# Patient Record
Sex: MALE | Race: WHITE | Employment: FULL TIME | URBAN - METROPOLITAN AREA
[De-identification: names, ages, dates, MRNs, and addresses within clinical notes are randomized per-mention and may not be internally consistent; named-entity substitution may affect disease eponyms.]

---

## 2017-04-18 ENCOUNTER — HOSPITAL ENCOUNTER (EMERGENCY)
Age: 32
Discharge: HOME | End: 2017-04-18
Attending: EMERGENCY MEDICINE | Admitting: EMERGENCY MEDICINE
Payer: SELF-PAY

## 2017-04-18 VITALS
HEIGHT: 67 IN | SYSTOLIC BLOOD PRESSURE: 129 MMHG | DIASTOLIC BLOOD PRESSURE: 86 MMHG | OXYGEN SATURATION: 99 % | HEART RATE: 76 BPM | RESPIRATION RATE: 18 BRPM | BODY MASS INDEX: 27.34 KG/M2 | WEIGHT: 174.16 LBS | TEMPERATURE: 98.1 F

## 2017-04-18 DIAGNOSIS — K29.90 GASTRITIS AND DUODENITIS: Primary | ICD-10-CM

## 2017-04-18 LAB
ALBUMIN SERPL BCP-MCNC: 4.1 G/DL (ref 3.5–5)
ALBUMIN/GLOB SERPL: 1.2 {RATIO} (ref 1.1–2.2)
ALP SERPL-CCNC: 68 U/L (ref 45–117)
ALT SERPL-CCNC: 22 U/L (ref 12–78)
ANION GAP BLD CALC-SCNC: 11 MMOL/L (ref 5–15)
APPEARANCE UR: CLEAR
AST SERPL W P-5'-P-CCNC: 15 U/L (ref 15–37)
BASOPHILS # BLD AUTO: 0 K/UL (ref 0–0.1)
BASOPHILS # BLD: 0 % (ref 0–1)
BILIRUB SERPL-MCNC: 2.2 MG/DL (ref 0.2–1)
BILIRUB UR QL: NEGATIVE
BUN SERPL-MCNC: 8 MG/DL (ref 6–20)
BUN/CREAT SERPL: 8 (ref 12–20)
CALCIUM SERPL-MCNC: 9.1 MG/DL (ref 8.5–10.1)
CHLORIDE SERPL-SCNC: 107 MMOL/L (ref 97–108)
CO2 SERPL-SCNC: 24 MMOL/L (ref 21–32)
COLOR UR: ABNORMAL
CREAT SERPL-MCNC: 1.01 MG/DL (ref 0.7–1.3)
EOSINOPHIL # BLD: 0.1 K/UL (ref 0–0.4)
EOSINOPHIL NFR BLD: 1 % (ref 0–7)
ERYTHROCYTE [DISTWIDTH] IN BLOOD BY AUTOMATED COUNT: 13 % (ref 11.5–14.5)
GLOBULIN SER CALC-MCNC: 3.4 G/DL (ref 2–4)
GLUCOSE SERPL-MCNC: 90 MG/DL (ref 65–100)
GLUCOSE UR STRIP.AUTO-MCNC: NEGATIVE MG/DL
HCT VFR BLD AUTO: 42.8 % (ref 36.6–50.3)
HGB BLD-MCNC: 15 G/DL (ref 12.1–17)
HGB UR QL STRIP: NEGATIVE
KETONES UR QL STRIP.AUTO: 15 MG/DL
LEUKOCYTE ESTERASE UR QL STRIP.AUTO: NEGATIVE
LIPASE SERPL-CCNC: 137 U/L (ref 73–393)
LYMPHOCYTES # BLD AUTO: 27 % (ref 12–49)
LYMPHOCYTES # BLD: 2.6 K/UL (ref 0.8–3.5)
MCH RBC QN AUTO: 30.8 PG (ref 26–34)
MCHC RBC AUTO-ENTMCNC: 35 G/DL (ref 30–36.5)
MCV RBC AUTO: 87.9 FL (ref 80–99)
MONOCYTES # BLD: 0.7 K/UL (ref 0–1)
MONOCYTES NFR BLD AUTO: 7 % (ref 5–13)
NEUTS SEG # BLD: 6.2 K/UL (ref 1.8–8)
NEUTS SEG NFR BLD AUTO: 65 % (ref 32–75)
NITRITE UR QL STRIP.AUTO: NEGATIVE
PH UR STRIP: 7 [PH] (ref 5–8)
PLATELET # BLD AUTO: 282 K/UL (ref 150–400)
POTASSIUM SERPL-SCNC: 3.6 MMOL/L (ref 3.5–5.1)
PROT SERPL-MCNC: 7.5 G/DL (ref 6.4–8.2)
PROT UR STRIP-MCNC: NEGATIVE MG/DL
RBC # BLD AUTO: 4.87 M/UL (ref 4.1–5.7)
SODIUM SERPL-SCNC: 142 MMOL/L (ref 136–145)
SP GR UR REFRACTOMETRY: 1.02 (ref 1–1.03)
UROBILINOGEN UR QL STRIP.AUTO: 1 EU/DL (ref 0.2–1)
WBC # BLD AUTO: 9.6 K/UL (ref 4.1–11.1)

## 2017-04-18 PROCEDURE — 74011250637 HC RX REV CODE- 250/637: Performed by: EMERGENCY MEDICINE

## 2017-04-18 PROCEDURE — 74011250636 HC RX REV CODE- 250/636: Performed by: EMERGENCY MEDICINE

## 2017-04-18 PROCEDURE — 81003 URINALYSIS AUTO W/O SCOPE: CPT

## 2017-04-18 PROCEDURE — 85025 COMPLETE CBC W/AUTO DIFF WBC: CPT

## 2017-04-18 PROCEDURE — 80053 COMPREHEN METABOLIC PANEL: CPT

## 2017-04-18 PROCEDURE — 99283 EMERGENCY DEPT VISIT LOW MDM: CPT

## 2017-04-18 PROCEDURE — 96375 TX/PRO/DX INJ NEW DRUG ADDON: CPT

## 2017-04-18 PROCEDURE — C9113 INJ PANTOPRAZOLE SODIUM, VIA: HCPCS | Performed by: EMERGENCY MEDICINE

## 2017-04-18 PROCEDURE — 36415 COLL VENOUS BLD VENIPUNCTURE: CPT

## 2017-04-18 PROCEDURE — 83690 ASSAY OF LIPASE: CPT

## 2017-04-18 PROCEDURE — 96374 THER/PROPH/DIAG INJ IV PUSH: CPT

## 2017-04-18 PROCEDURE — 74011000250 HC RX REV CODE- 250: Performed by: EMERGENCY MEDICINE

## 2017-04-18 RX ORDER — HYDROGEN PEROXIDE 3 %
40 SOLUTION, NON-ORAL MISCELLANEOUS DAILY
COMMUNITY
End: 2020-04-02 | Stop reason: ALTCHOICE

## 2017-04-18 RX ORDER — ONDANSETRON 2 MG/ML
4 INJECTION INTRAMUSCULAR; INTRAVENOUS
Status: COMPLETED | OUTPATIENT
Start: 2017-04-18 | End: 2017-04-18

## 2017-04-18 RX ORDER — SUCRALFATE 1 G/1
1 TABLET ORAL 4 TIMES DAILY
Qty: 15 TAB | Refills: 0 | Status: SHIPPED | OUTPATIENT
Start: 2017-04-18 | End: 2020-04-02 | Stop reason: ALTCHOICE

## 2017-04-18 RX ADMIN — LIDOCAINE HYDROCHLORIDE 40 ML: 20 SOLUTION ORAL; TOPICAL at 07:57

## 2017-04-18 RX ADMIN — SODIUM CHLORIDE 40 MG: 9 INJECTION INTRAMUSCULAR; INTRAVENOUS; SUBCUTANEOUS at 07:57

## 2017-04-18 RX ADMIN — ONDANSETRON HYDROCHLORIDE 4 MG: 2 INJECTION, SOLUTION INTRAMUSCULAR; INTRAVENOUS at 07:58

## 2017-04-18 RX ADMIN — SODIUM CHLORIDE 500 ML: 900 INJECTION, SOLUTION INTRAVENOUS at 08:52

## 2017-04-18 NOTE — ED PROVIDER NOTES
HPI Comments: 26 yo M with PMHx of severe GERD, hx of hiatal hernia, ureteral stone presenting with c/o n/v, diarrhea. Pt states for the past 2 weeks he has been taking double his dose of nexium. This morning approximately 3 am he woke with burning sensation in chest and vomited x 5 with 5 diarrhea episodes. Denies bloody in emesis and stool. Denies sick contacts or suspicious foods. Denies f/c, abdominal pain, chest pain. States he was having regular BM prior to onset of diarrhea. No hx of abdominal surgeries. No urinary tract sx's. Denies hx of cholelithiasis. Denies alcohol use. Takes 1 aleve every other day. He is not being followed by a GI physician. Patient is a 27 y.o. male presenting with abdominal pain. Abdominal Pain    Associated symptoms include diarrhea, nausea and vomiting. Pertinent negatives include no fever and no chest pain. No past medical history on file. No past surgical history on file. No family history on file. Social History     Social History    Marital status: SINGLE     Spouse name: N/A    Number of children: N/A    Years of education: N/A     Occupational History    Not on file. Social History Main Topics    Smoking status: Never Smoker    Smokeless tobacco: Not on file    Alcohol use No    Drug use: Not on file    Sexual activity: Not on file     Other Topics Concern    Not on file     Social History Narrative         ALLERGIES: Review of patient's allergies indicates no known allergies. Review of Systems   Constitutional: Negative. Negative for chills and fever. HENT: Negative. Respiratory: Negative. Negative for cough. Cardiovascular: Negative. Negative for chest pain. Gastrointestinal: Positive for abdominal pain (epigastric), diarrhea, nausea and vomiting. Negative for abdominal distention and blood in stool. Reflux; -hematemesis   Genitourinary: Negative. Musculoskeletal: Negative. Skin: Negative.     Neurological: Negative. Psychiatric/Behavioral: Negative. Patient Vitals for the past 12 hrs:   Temp Pulse Resp BP SpO2   04/18/17 0901 - - - - 99 %   04/18/17 0731 98.1 °F (36.7 °C) 76 18 129/86 99 %   04/18/17 0728 - - - 129/86 -            Physical Exam   Constitutional: He is oriented to person, place, and time. He appears well-developed and well-nourished. No distress. HENT:   Head: Normocephalic and atraumatic. Eyes: Conjunctivae and EOM are normal.   Neck: Neck supple. Cardiovascular: Normal rate and regular rhythm. Pulmonary/Chest: Effort normal and breath sounds normal. No respiratory distress. He has no wheezes. He has no rales. Abdominal: Soft. Bowel sounds are normal. He exhibits no distension. There is no tenderness. There is no rebound and no guarding. No CVA ttp   Musculoskeletal: Normal range of motion. Neurological: He is alert and oriented to person, place, and time. Skin: Skin is warm and dry. He is not diaphoretic. Psychiatric: He has a normal mood and affect. Nursing note and vitals reviewed. MDM  Number of Diagnoses or Management Options  Diagnosis management comments: 28 yo M with PMHx of severe GERD, hx of hiatal hernia, ureteral stone presenting with n/v, diarrhea and concern for GERD flare up. DDx: Gastritis, GERD, PUD, pancreatitis, gastroenteritis, bowel obstruction. Suspect patient is having acute GERD flare. Low suspicion for acute PUD. Pt has a benign abdomen with no evidence of gastric perforation. Pt having normal bowel movements prior to onset of sx's without abdominal distention and normal bowel sounds therefore do not suspect bowel obstruction. No signs or symptoms of UTI or renal colic. Pt does not appear dehydrated. Will check CBC for leukocytosis, BMP for electrolyte abnormality, LFTS and lipase for hepatobiliary and pancreatic pathology. UA to r/o UTI. Will treat with GI cocktail, IV PPI, and zofran.      ED Course       Procedures   4394 - Pt feels better and has tolerated PO. Labs unremarkable. Will dc home with sucralfate and GI follow up. LABORATORY TESTS:  Recent Results (from the past 12 hour(s))   CBC WITH AUTOMATED DIFF    Collection Time: 04/18/17  7:53 AM   Result Value Ref Range    WBC 9.6 4.1 - 11.1 K/uL    RBC 4.87 4.10 - 5.70 M/uL    HGB 15.0 12.1 - 17.0 g/dL    HCT 42.8 36.6 - 50.3 %    MCV 87.9 80.0 - 99.0 FL    MCH 30.8 26.0 - 34.0 PG    MCHC 35.0 30.0 - 36.5 g/dL    RDW 13.0 11.5 - 14.5 %    PLATELET 725 992 - 359 K/uL    NEUTROPHILS 65 32 - 75 %    LYMPHOCYTES 27 12 - 49 %    MONOCYTES 7 5 - 13 %    EOSINOPHILS 1 0 - 7 %    BASOPHILS 0 0 - 1 %    ABS. NEUTROPHILS 6.2 1.8 - 8.0 K/UL    ABS. LYMPHOCYTES 2.6 0.8 - 3.5 K/UL    ABS. MONOCYTES 0.7 0.0 - 1.0 K/UL    ABS. EOSINOPHILS 0.1 0.0 - 0.4 K/UL    ABS. BASOPHILS 0.0 0.0 - 0.1 K/UL   METABOLIC PANEL, COMPREHENSIVE    Collection Time: 04/18/17  7:53 AM   Result Value Ref Range    Sodium 142 136 - 145 mmol/L    Potassium 3.6 3.5 - 5.1 mmol/L    Chloride 107 97 - 108 mmol/L    CO2 24 21 - 32 mmol/L    Anion gap 11 5 - 15 mmol/L    Glucose 90 65 - 100 mg/dL    BUN 8 6 - 20 MG/DL    Creatinine 1.01 0.70 - 1.30 MG/DL    BUN/Creatinine ratio 8 (L) 12 - 20      GFR est AA >60 >60 ml/min/1.73m2    GFR est non-AA >60 >60 ml/min/1.73m2    Calcium 9.1 8.5 - 10.1 MG/DL    Bilirubin, total 2.2 (H) 0.2 - 1.0 MG/DL    ALT (SGPT) 22 12 - 78 U/L    AST (SGOT) 15 15 - 37 U/L    Alk.  phosphatase 68 45 - 117 U/L    Protein, total 7.5 6.4 - 8.2 g/dL    Albumin 4.1 3.5 - 5.0 g/dL    Globulin 3.4 2.0 - 4.0 g/dL    A-G Ratio 1.2 1.1 - 2.2     LIPASE    Collection Time: 04/18/17  7:53 AM   Result Value Ref Range    Lipase 137 73 - 393 U/L   URINALYSIS W/ RFLX MICROSCOPIC    Collection Time: 04/18/17  8:51 AM   Result Value Ref Range    Color YELLOW/STRAW      Appearance CLEAR CLEAR      Specific gravity 1.020 1.003 - 1.030      pH (UA) 7.0 5.0 - 8.0      Protein NEGATIVE  NEG mg/dL    Glucose NEGATIVE  NEG mg/dL Ketone 15 (A) NEG mg/dL    Bilirubin NEGATIVE  NEG      Blood NEGATIVE  NEG      Urobilinogen 1.0 0.2 - 1.0 EU/dL    Nitrites NEGATIVE  NEG      Leukocyte Esterase NEGATIVE  NEG         IMAGING RESULTS:  No orders to display       MEDICATIONS GIVEN:  Medications   mylanta/viscous lidocaine (JI)(GI COCKTAIL) (40 mL Oral Given 4/18/17 9567)   ondansetron (ZOFRAN) injection 4 mg (4 mg IntraVENous Given 4/18/17 6728)   pantoprazole (PROTONIX) 40 mg in sodium chloride 0.9 % 10 mL injection (40 mg IntraVENous Given 4/18/17 6687)   sodium chloride 0.9 % bolus infusion 500 mL (0 mL IntraVENous IV Completed 4/18/17 5422)       IMPRESSION:  1. Gastritis and duodenitis        PLAN:  Current Discharge Medication List      START taking these medications    Details   sucralfate (CARAFATE) 1 gram tablet Take 1 Tab by mouth four (4) times daily. Qty: 15 Tab, Refills: 0           Follow-up Information     Follow up With Details Comments Contact Info    None   None ([phone]) Patient stated that they have no PCP      [facility] EMERGENCY DEPT  If symptoms worsen [address] [place] [address] Gastroenterology [address] [facility] an appointment as soon as possible for a visit in 2 weeks  [address]  [address] [address] [facility]  [phone]        Return to ED if worse       DISCHARGE NOTE:    9:52 AM  The patient is ready for discharge. The patient signs, symptoms, diagnosis, and discharge instructions have been discussed and the patient has conveyed their understanding. The patient is to follow-up as reccommended or returned to the ER should their symptoms worsen. Plan has been discussed and patient is in agreement.             ------------------------------------------  Begin Attending Documentation  ------------------------------------------    Attending Attestation: I was not present during the patient's evaluation by the resident.   I personally evaluated the patient including the history and physical. I have read the resident's note and agree with their history, physical and plan. HPI: Zion Xavier is a 27 y.o. male with hx of GERD who presents ambulatory to ED [facility] ED with CC of N/V/D and burning epigastric ABD pain since ~0300 today. Pt reports ~5-6 episodes of vomiting and diarrhea since onset. Pt reports he has been taking Nexium x 2 daily x ~1-2 weeks, noting his usual GERD symptoms have been notably worse. Pt denies factors which exacerbate his symptoms, and specifically denies his symptoms are exacerbated by PO intake. He denies sick contacts, recent travel, or eating any unusual or questionable foods. Pt denies hx of ABD surgeries. He denies currently being followed by a GI specialist. Pt denies current abdominal pain, hematochezia, hematemesis, fever, chills, cough, and CP. PCP: None    There are no other complaints, changes, or physical findings at this time. Written by David Carvajal ED Scribe, as dictated by Carola Renae MD.    PE:  Gen: NAD, WD/WN   Heart: nl S1, S2, no m/r/g   Lungs: CTAB, no w/r/r   Abd: soft, nttp, ND   Ext: no swelling   Skin: no rashes   Neuro: grossly intact, no focal deficits    Assessment:   Differential includes gastritis, gastroenteritis, GERD, PUD, cholecystitis, choledocholithiasis, pancreatitis, UTI, nephrolithiasis. Patient presents to ED with v/d. He reports recent epigastric pain but denies current abdominal pain. CBC, CMP, lipase and UA WNL. Abdominal exam is benign - do not suspect acute intraabdominal process. Symptoms likely secondary to gastroenteritis vs gastritis vs GERD vs PUD. Patient is feeling better after symptomatic management and is tolerating po. Will discharge with carafate and GI follow up.     Diagnosis: Gastroenteritis    Genny Lynne MD.    ------------------------------------------  End Attending Documentation  ------------------------------------------

## 2017-04-18 NOTE — Clinical Note
Please return to ER if you develop severe abdominal or chest pain, fever or chills, or inability to to take in food or water.

## 2019-05-15 PROBLEM — K21.9 GERD WITHOUT ESOPHAGITIS: Status: ACTIVE | Noted: 2019-05-15

## 2020-04-02 ENCOUNTER — OFFICE VISIT (OUTPATIENT)
Dept: INTERNAL MEDICINE CLINIC | Age: 35
End: 2020-04-02

## 2020-04-02 VITALS
RESPIRATION RATE: 14 BRPM | OXYGEN SATURATION: 97 % | WEIGHT: 194.2 LBS | HEIGHT: 67 IN | TEMPERATURE: 97.7 F | HEART RATE: 90 BPM | BODY MASS INDEX: 30.48 KG/M2 | SYSTOLIC BLOOD PRESSURE: 118 MMHG | DIASTOLIC BLOOD PRESSURE: 70 MMHG

## 2020-04-02 DIAGNOSIS — Z00.00 ROUTINE PHYSICAL EXAMINATION: Primary | ICD-10-CM

## 2020-04-02 DIAGNOSIS — K21.9 GERD WITHOUT ESOPHAGITIS: ICD-10-CM

## 2020-04-02 DIAGNOSIS — J01.00 ACUTE MAXILLARY SINUSITIS, RECURRENCE NOT SPECIFIED: ICD-10-CM

## 2020-04-02 LAB
A-G RATIO,AGRAT: 1.5 RATIO
ALBUMIN SERPL-MCNC: 4.5 G/DL (ref 3.9–5.4)
ALP SERPL-CCNC: 76 U/L (ref 38–126)
ALT SERPL-CCNC: 33 U/L (ref 0–50)
ANION GAP SERPL CALC-SCNC: 15 MMOL/L
AST SERPL W P-5'-P-CCNC: 26 U/L (ref 14–36)
BACTERIA,BACTU: ABNORMAL
BILIRUB SERPL-MCNC: 0.9 MG/DL (ref 0.2–1.3)
BILIRUB UR QL: NEGATIVE
BUN SERPL-MCNC: 14 MG/DL (ref 9–20)
BUN/CREATININE RATIO,BUCR: 13 RATIO
CALCIUM SERPL-MCNC: 9.9 MG/DL (ref 8.4–10.2)
CHLORIDE SERPL-SCNC: 103 MMOL/L (ref 98–107)
CHOL/HDL RATIO,CHHD: 4 RATIO (ref 0–4)
CHOLEST SERPL-MCNC: 167 MG/DL (ref 0–200)
CLARITY: CLEAR
CO2 SERPL-SCNC: 25 MMOL/L (ref 22–32)
COLOR UR: ABNORMAL
CREAT SERPL-MCNC: 1.1 MG/DL (ref 0.8–1.5)
GLOBULIN,GLOB: 3.1
GLUCOSE 24H UR-MRATE: NEGATIVE G/(24.H)
GLUCOSE SERPL-MCNC: 99 MG/DL (ref 75–110)
HDLC SERPL-MCNC: 41 MG/DL (ref 35–130)
HGB UR QL STRIP: NEGATIVE
KETONES UR QL STRIP.AUTO: NEGATIVE
LDL/HDL RATIO,LDHD: 2 RATIO
LDLC SERPL CALC-MCNC: 84 MG/DL (ref 0–130)
LEUKOCYTE ESTERASE: NEGATIVE
NITRITE UR QL STRIP.AUTO: NEGATIVE
PH UR STRIP: 7 [PH] (ref 5–7)
POTASSIUM SERPL-SCNC: 4.3 MMOL/L (ref 3.6–5)
PROT SERPL-MCNC: 7.6 G/DL (ref 6.3–8.2)
PROT UR STRIP-MCNC: NEGATIVE MG/DL
RBC #/AREA URNS HPF: 0 #/HPF
SODIUM SERPL-SCNC: 143 MMOL/L (ref 137–145)
SP GR UR REFRACTOMETRY: 1.01 (ref 1–1.03)
TRIGL SERPL-MCNC: 209 MG/DL (ref 0–200)
UROBILINOGEN UR QL STRIP.AUTO: NEGATIVE
VLDLC SERPL CALC-MCNC: 42 MG/DL
WBC URNS QL MICRO: 0 #/HPF

## 2020-04-02 RX ORDER — CEFUROXIME AXETIL 500 MG/1
500 TABLET ORAL 2 TIMES DAILY
Qty: 20 TAB | Refills: 0 | Status: SHIPPED | OUTPATIENT
Start: 2020-04-02 | End: 2020-04-12

## 2020-04-02 NOTE — PROGRESS NOTES
Jef Tses. is a 27 y.o. male presenting for Complete Physical and Sinus Infection  . 1. Have you been to the ER, urgent care clinic since your last visit? Hospitalized since your last visit? No    2. Have you seen or consulted any other health care providers outside of the [address] [place] [address] since your last visit? Include any pap smears or colon screening. No    No flowsheet data found. No flowsheet data found. 3 most recent PHQ Screens 4/2/2020   Little interest or pleasure in doing things Not at all   Feeling down, depressed, irritable, or hopeless Not at all   Total Score PHQ 2 0       There are no discontinued medications.

## 2020-04-02 NOTE — PROGRESS NOTES
This note will not be viewable in [address] Subjective:     Delisa Sher is a 27 y.o. male presenting for annual comprehensive personal healthcare examination. Jaxson Esparza is a 54-year-old single male who presents to the office today with complete checkup. He also complains of a sinus infection and bilateral foot pain. The patient generally has been in excellent health. He has no active medical problems but is suffered with acid reflux in the past.  He denies any dysphagia or unexplained weight loss. He has had no breakthrough heartburn symptoms. The patient has never had surgery, is on no medications, does not smoke or drink. 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 He complains of an upper respiratory infection which is been ongoing for a week or 2. He has sinus congestion with yellow to green postnasal drainage. He denies any facial pain or severe headaches. He denies any severe sore throat and has had no cough or fever. He denies any fatigue. He also complains of bilateral foot pain. Pain is localized on the bottom of the feet and usually gets worse if he sits for any length of time and then gets up to walk or when he first gets up in the morning. The patient has changed shoes/boots 3 times in the several month period of time that he has had this pain. He does not usually go barefoot. He notes no foot swelling or redness. History of present illness: This patient has multiple medical problems. These include:  Patient Active Problem List   Diagnosis Code    GERD without esophagitis K21.9        History reviewed. No pertinent past medical history. History reviewed. No pertinent surgical history. No Known Allergies  Current Outpatient Medications   Medication Sig Dispense Refill    cefUROXime (CEFTIN) 500 mg tablet Take 1 Tab by mouth two (2) times a day for 10 days.  20 Tab 0     Social History     Socioeconomic History    Marital status: SINGLE     Spouse name: Not on file  Number of children: Not on file    Years of education: Not on file    Highest education level: Not on file   Tobacco Use    Smoking status: Never Smoker    Smokeless tobacco: Never Used   Substance and Sexual Activity    Alcohol use: No    Drug use: No     History reviewed. No pertinent family history. Health Maintenance   Topic Date Due    Influenza Age 5 to Adult  08/01/2020    DTaP/Tdap/Td series (2 - Td) 08/11/2026    Pneumococcal 0-64 years  Aged Out       Review of Systems  Constitutional:  He denies fevers, weight loss, sweats, or fatigue. HEENT: Positive for sinus congestion, purulent postnasal drainage and maxillary sinus discomfort  Respiratory:  No cough, wheezing or shortness of breath, or sputum production. Cardiac:  Denies chest pain, palpitations, unexplained indigestion, syncope, edema, PND or orthopnea. GI:  No changes in bowel habits, abdominal pain, no bloating, anorexia, nausea, vomiting or heartburn. :  He denies frequency, nocturia, stranguria, dysuria or sexual dysfunction. Extremities: Today for bilateral foot pain which is generally worse when he first gets out of bed in the morning and improves the more he is walking during the day  Skin:  No recent rash or mole changes. Neurological:  No numbness, tingling, burning paresthesias or loss of motor strength. No syncope, dizziness, frequent headaches or memory loss. ROS otherwise negative       Objective:     Vitals:    04/02/20 1109   BP: 118/70   Pulse: 90   Resp: 14   Temp: 97.7 °F (36.5 °C)   TempSrc: Oral   SpO2: 97%   Weight: 194 lb 3.2 oz (88.1 kg)   Height: 5' 7\" (1.702 m)   PainSc:   0 - No pain      Body mass index is 30.42 kg/m². Physical exam:   General Appearance:  Well-developed, well-nourished, no acute distress. Vision:  Deferred to ophthalmologist.    Hearing: HEENT:    Ears:  The TMs and ear canals were clear.   Eyes:  The pupillary responses were normal.  Extraocular muscle function intact. Lids and conjunctiva not injected. Funduscopic exam revealed sharp disc margins. Pharynx:  Clear with teeth in good repair. A yellowish postnasal drainage is noted neck:  Supple without thyromegaly or adenopathy. No JVD noted. No carotid bruits. Lungs: Clear to auscultation and percussion. Cardiac:  Regular rate and rhythm. No murmur. PMI not displaced. No gallop, rub or click. Abdomen:  Flat, soft, non-tender without palpable organomegaly or mass. No pulsatile mass was felt, and no bruit was heard. Bowel sounds were active. Extremities:  No clubbing, cyanosis or edema. Bilateral plantar fascial pain present with palpation  Pulses:  Dorsalis pedis and posterior tibial pulses felt without difficulty. Skin:  No unusual rash or mole changes noted. Lymph Nodes:  None felt in the cervical, supraclavicular, axillary or inguinal region. Neurological:  Cranial nerves II-XII grossly intact. Motor strength 5/5. DTRs 2+ and symmetric. Station and gait normal.         Assessment/Plan:   Impressions:  Diagnoses and all orders for this visit:    Routine physical examination  -     CBC WITH AUTOMATED DIFF  -     COLLECTION VENOUS BLOOD,VENIPUNCTURE  -     LIPID PANEL  -     METABOLIC PANEL, COMPREHENSIVE  -     TSH 3RD GENERATION  -     URINALYSIS W/MICROSCOPIC    GERD without esophagitis    Acute maxillary sinusitis, recurrence not specified  -     cefUROXime (CEFTIN) 500 mg tablet; Take 1 Tab by mouth two (2) times a day for 10 days. , Normal, Disp-20 Tab, R-0        Other instructions: The patient is currently on no medications. Body mass index is 30.4 and dietary counseling along with printed patient education is given    I have prescribed Ceftin for his sinusitis and have recommended Mucinex and Sudafed as needed for his symptoms. Heel stretches, Advil/Aleve, good fitting shoes recommended for plantar fasciitis.   Should he fail conservative management over the next 2 to 4 weeks would refer to foot and ankle specialist.    Await results of multiple labs    Follow-up yearly    Follow-up and Dispositions    · Return in about 1 year (around 4/2/2021). Willie Martinez MD    Please note that this dictation was completed with [employer], the computer voice recognition software. Quite often unanticipated grammatical, syntax, homophones, and other interpretive errors are inadvertently transcribed by the computer software. Please disregard these errors. Please excuse any errors that have escaped final proofreading.

## 2020-04-02 NOTE — PATIENT INSTRUCTIONS

## 2020-04-03 LAB
BASOPHILS # BLD AUTO: 0.1 X10E3/UL (ref 0–0.2)
BASOPHILS NFR BLD AUTO: 1 %
EOSINOPHIL # BLD AUTO: 0.2 X10E3/UL (ref 0–0.4)
EOSINOPHIL NFR BLD AUTO: 2 %
ERYTHROCYTE [DISTWIDTH] IN BLOOD BY AUTOMATED COUNT: 13.1 % (ref 11.6–15.4)
HCT VFR BLD AUTO: 45.5 % (ref 37.5–51)
HGB BLD-MCNC: 15.3 G/DL (ref 13–17.7)
IMM GRANULOCYTES # BLD AUTO: 0.1 X10E3/UL (ref 0–0.1)
IMM GRANULOCYTES NFR BLD AUTO: 1 %
LYMPHOCYTES # BLD AUTO: 3.2 X10E3/UL (ref 0.7–3.1)
LYMPHOCYTES NFR BLD AUTO: 33 %
MCH RBC QN AUTO: 30.2 PG (ref 26.6–33)
MCHC RBC AUTO-ENTMCNC: 33.6 G/DL (ref 31.5–35.7)
MCV RBC AUTO: 90 FL (ref 79–97)
MONOCYTES # BLD AUTO: 0.6 X10E3/UL (ref 0.1–0.9)
MONOCYTES NFR BLD AUTO: 6 %
NEUTROPHILS # BLD AUTO: 5.7 X10E3/UL (ref 1.4–7)
NEUTROPHILS NFR BLD AUTO: 57 %
PLATELET # BLD AUTO: 375 X10E3/UL (ref 150–450)
RBC # BLD AUTO: 5.06 X10E6/UL (ref 4.14–5.8)
TSH SERPL DL<=0.05 MIU/L-ACNC: 1.73 UIU/ML (ref 0.34–5.6)
WBC # BLD AUTO: 9.7 X10E3/UL (ref 3.4–10.8)

## 2020-09-23 ENCOUNTER — TELEPHONE (OUTPATIENT)
Dept: INTERNAL MEDICINE CLINIC | Age: 35
End: 2020-09-23

## 2020-09-23 NOTE — TELEPHONE ENCOUNTER
Patient accepted appt with Zuleyka Valencia on 9/29/20 at 8:00 am. He is on a big job and is unable to come in tomorrow. He would like an appt sooner if it's possible.

## 2020-09-23 NOTE — TELEPHONE ENCOUNTER
Patient states he has a sinus infection. He has a bloody nose and drainage but no fever.  He would like a prescription called in.     [phone]

## 2020-09-29 ENCOUNTER — OFFICE VISIT (OUTPATIENT)
Dept: INTERNAL MEDICINE CLINIC | Age: 35
End: 2020-09-29
Payer: COMMERCIAL

## 2020-09-29 VITALS
OXYGEN SATURATION: 96 % | SYSTOLIC BLOOD PRESSURE: 129 MMHG | WEIGHT: 197.8 LBS | RESPIRATION RATE: 18 BRPM | HEART RATE: 81 BPM | HEIGHT: 67 IN | BODY MASS INDEX: 31.04 KG/M2 | TEMPERATURE: 98.6 F | DIASTOLIC BLOOD PRESSURE: 92 MMHG

## 2020-09-29 DIAGNOSIS — J01.10 ACUTE FRONTAL SINUSITIS, RECURRENCE NOT SPECIFIED: Primary | ICD-10-CM

## 2020-09-29 DIAGNOSIS — G43.109 MIGRAINE WITH AURA AND WITHOUT STATUS MIGRAINOSUS, NOT INTRACTABLE: ICD-10-CM

## 2020-09-29 DIAGNOSIS — K21.9 GERD WITHOUT ESOPHAGITIS: ICD-10-CM

## 2020-09-29 DIAGNOSIS — K44.9 HIATAL HERNIA: ICD-10-CM

## 2020-09-29 PROCEDURE — 99214 OFFICE O/P EST MOD 30 MIN: CPT | Performed by: NURSE PRACTITIONER

## 2020-09-29 PROCEDURE — 96372 THER/PROPH/DIAG INJ SC/IM: CPT | Performed by: NURSE PRACTITIONER

## 2020-09-29 RX ORDER — BUTALBITAL, ACETAMINOPHEN AND CAFFEINE 50; 325; 40 MG/1; MG/1; MG/1
1 TABLET ORAL
Qty: 60 TAB | Refills: 1 | Status: SHIPPED | OUTPATIENT
Start: 2020-09-29 | End: 2021-10-12 | Stop reason: ALTCHOICE

## 2020-09-29 RX ORDER — METHYLPREDNISOLONE ACETATE 40 MG/ML
40 INJECTION, SUSPENSION INTRA-ARTICULAR; INTRALESIONAL; INTRAMUSCULAR; SOFT TISSUE ONCE
Qty: 1 VIAL | Refills: 0
Start: 2020-09-29 | End: 2020-09-29

## 2020-09-29 RX ORDER — PANTOPRAZOLE SODIUM 40 MG/1
40 TABLET, DELAYED RELEASE ORAL DAILY
Qty: 90 TAB | Refills: 1 | Status: SHIPPED | OUTPATIENT
Start: 2020-09-29 | End: 2021-03-29

## 2020-09-29 RX ORDER — AMOXICILLIN AND CLAVULANATE POTASSIUM 875; 125 MG/1; MG/1
1 TABLET, FILM COATED ORAL EVERY 12 HOURS
Qty: 14 TAB | Refills: 0 | Status: SHIPPED | OUTPATIENT
Start: 2020-09-29 | End: 2021-10-12 | Stop reason: ALTCHOICE

## 2020-09-29 NOTE — PROGRESS NOTES
Subjective:     No chief complaint on file. History of present illness:  Angeline Patel is a 27 y.o. male who presents today with worsening frontal facial pressure, with thick yellow/green discharge from his frontal sinuses, and occasional cough secondary to postnasal drip. He states he has not been using anything over-the-counter to treat his symptoms. He states his nasal discharge is worse in the morning, and sometimes streaked with blood. He denies any fever, nausea, or vomiting. Additionally the patient presents with a history of gastroesophageal reflux disease, and hiatal hernia. He states these were diagnosed after hospitalization a few years ago. He had an esophagogastroduodenoscopy done at that time for verification and diagnosis. At that time, he was placed on Nexium 40 mg daily, however he discontinued use of this after he had heard some legal issues and lawsuits related to use. He states that he has had worsening reflux symptoms over the past several months, and has tried some over-the-counter Nexium to combat it. He states he would like to try something different if possible. He states they gave him Protonix when he was hospitalized, and it seemed to work well for him. The patient also has a history of migraine headaches as a juvenile, and had used something in the past to help with his episodes. He states there was a hiatus of his migraines for a period of time, but has not noticed them returning over the past year or so. He states they are often associated with some mild aura, and with photophobia and audiophobia. He would like something to help with his headaches as an abortive if possible. Patient Active Problem List   Diagnosis Code    GERD without esophagitis K21.9      No past medical history on file. No Known Allergies   No family history on file.    Social History     Socioeconomic History    Marital status: SINGLE     Spouse name: Not on file    Number of children: Not on file    Years of education: Not on file    Highest education level: Not on file   Occupational History    Not on file   Social Needs    Financial resource strain: Not on file    Food insecurity     Worry: Not on file     Inability: Not on file    Transportation needs     Medical: Not on file     Non-medical: Not on file   Tobacco Use    Smoking status: Never Smoker    Smokeless tobacco: Never Used   Substance and Sexual Activity    Alcohol use: No    Drug use: No    Sexual activity: Not on file   Lifestyle    Physical activity     Days per week: Not on file     Minutes per session: Not on file    Stress: Not on file   Relationships    Social connections     Talks on phone: Not on file     Gets together: Not on file     Attends Bahai service: Not on file     Active member of club or organization: Not on file     Attends meetings of clubs or organizations: Not on file     Relationship status: Not on file    Intimate partner violence     Fear of current or ex partner: Not on file     Emotionally abused: Not on file     Physically abused: Not on file     Forced sexual activity: Not on file   Other Topics Concern    Not on file   Social History Narrative    Not on file     Prior to Admission medications    Not on File        Review of Systems              Constitutional:  He denies fever, weight loss, sweats or fatigue. EYES: No visual disturbance or changes. ENT: Progressively worse nasal congestion, with headache but without dizziness. No difficulty with swallowing, taste,  speech or smell. Respiratory: Occasional cough, but without wheezing or shortness of breath. No sputum production. Cardiac:  Denies chest pain, palpitations, unexplained indigestion, syncope, edema, PND or orthopnea. GI: Intermittent heartburn. No changes in bowel movements, abdominal pain, bloating, anorexia, nausea, vomiting. No tarry stools or blood in stools.   : Negative for abnormality. Extremities:  No joint pain, stiffness or swelling  Back:. No acute pain or soreness  Skin:  No recent rashes or unexplained bruising. Neurological:  No numbness, tingling, burning paresthesias or loss of motor strength. Occasional migraine-like headaches. Hematologic:  No unexplained bruising or purpura. Lymphatic: No lymph node enlargement    Objective: There were no vitals filed for this visit. There is no height or weight on file to calculate BMI. Physical Examination:              General Appearance:  Well-developed, well-nourished, no acute distress. HEENT:      Ears:  The TMs are intact with cone of light present and ear canals were clear. Eyes:  PERRLA. Extraocular muscle function intact. Lids and conjunctiva not injected. No icteric sclera. Nares: Reddened and swollen with mild yellow exudate noted. Pharynx: Slightly swollen tonsillar glands reddened pillars and postnasal drip noted. No oral masses were noted. Neck:  Supple withoutadenopathy. No JVD noted. No bruits. Lungs:  Clear to auscultation and percussion X5. No abnormal breath sounds. Cardiac:  Regular rate and rhythm without murmur. PMI not displaced. No gallop, rub or click. Extremities:  No clubbing, cyanosis or edema. Skin:  No rash or unusual mole changes noted. Lymph Nodes: No significant lymphadenopathy. Neurological: . DTRs 2+ and symmetric. Station and gait normal.   Hematologic:   No purpura or petechiae        Assessment/Plan:       No diagnosis found. Impressions/Plan:    1: Patient shows symptoms of acute bacterial sinusitis. I will place him on Augmentin 875/125 mg twice daily for 7 days. I will give him a Depo-Medrol injection to 40 mg today. 2: Due to history of migraines, I will place the patient on some Fioricet as an abortive to try. I have also recommended he follow-up with his PCP regarding future migraine needs.   3: Patient to be placed on Protonix 40 mg daily for history of GERD and hiatal hernia. I have recommended him to consume bland diet and to avoid any acidic foods or beverages. Patient to follow-up with PCP in approximately 4 weeks for reevaluation. Patient states understanding and agrees with plan. Ulysses Lor, NP    The patient was given after the visit summary the patient verbalized an understanding of the plans and problems as explained.

## 2020-09-29 NOTE — PROGRESS NOTES
Reviewed record in preparation for visit and have obtained necessary documentation. Identified pt with two pt identifiers(name and ). Chief Complaint   Patient presents with    Sinus Pain        Coordination of Care Questionnaire:  :     1) Have you been to an emergency room, urgent care clinic since your last visit? No     Hospitalized since your last visit? No               2) Have you seen or consulted any other health care providers outside of [address] [place] [address] since your last visit? No     After obtaining consent, and per orders of Payam Cleveland NP., injection of MethylPrednisolone 40 mg/mL in left deltoid given by Loulou Mo. Patient instructed to remain in clinic for 20 minutes afterwards, and to report any adverse reaction to me immediately.

## 2021-03-27 DIAGNOSIS — K44.9 HIATAL HERNIA: ICD-10-CM

## 2021-03-27 DIAGNOSIS — K21.9 GERD WITHOUT ESOPHAGITIS: ICD-10-CM

## 2021-03-29 DIAGNOSIS — K21.9 GERD WITHOUT ESOPHAGITIS: ICD-10-CM

## 2021-03-29 DIAGNOSIS — K44.9 HIATAL HERNIA: ICD-10-CM

## 2021-03-29 RX ORDER — PANTOPRAZOLE SODIUM 40 MG/1
TABLET, DELAYED RELEASE ORAL
Qty: 90 TAB | Refills: 1 | Status: SHIPPED | OUTPATIENT
Start: 2021-03-29 | End: 2021-10-07

## 2021-03-29 RX ORDER — PANTOPRAZOLE SODIUM 40 MG/1
TABLET, DELAYED RELEASE ORAL
Qty: 90 TAB | Refills: 1 | Status: CANCELLED | OUTPATIENT
Start: 2021-03-29

## 2021-08-12 ENCOUNTER — TELEPHONE (OUTPATIENT)
Dept: INTERNAL MEDICINE CLINIC | Age: 36
End: 2021-08-12

## 2021-08-12 NOTE — TELEPHONE ENCOUNTER
Patient states he was working on a garage floor on Monday and their dog was in there with the fan blowing. The next day he was sneezing,tooth ache and yellow phlegm and a cough. Yesterday at work he was coughing so they sent him to be tested for covid at [facility] and he was negative. Today he states his nose to his forehead hurt. He believes he has a sinus infection. He states he gets one every year at this time. Does he need to come in or can you please prescribe an abx?      [phone]

## 2021-08-12 NOTE — TELEPHONE ENCOUNTER
These call patient. We will try to get him in tomorrow if there is a cancellation but we are very busy. May need to be seen at urgent care otherwise.

## 2021-10-06 DIAGNOSIS — K44.9 HIATAL HERNIA: ICD-10-CM

## 2021-10-06 DIAGNOSIS — K21.9 GERD WITHOUT ESOPHAGITIS: ICD-10-CM

## 2021-10-07 RX ORDER — PANTOPRAZOLE SODIUM 40 MG/1
TABLET, DELAYED RELEASE ORAL
Qty: 90 TABLET | Refills: 1 | Status: SHIPPED | OUTPATIENT
Start: 2021-10-07 | End: 2021-10-12 | Stop reason: ALTCHOICE

## 2021-10-12 ENCOUNTER — OFFICE VISIT (OUTPATIENT)
Dept: INTERNAL MEDICINE CLINIC | Age: 36
End: 2021-10-12
Payer: COMMERCIAL

## 2021-10-12 VITALS
OXYGEN SATURATION: 97 % | BODY MASS INDEX: 28.25 KG/M2 | TEMPERATURE: 98.4 F | RESPIRATION RATE: 20 BRPM | DIASTOLIC BLOOD PRESSURE: 70 MMHG | SYSTOLIC BLOOD PRESSURE: 122 MMHG | HEART RATE: 86 BPM | WEIGHT: 180 LBS | HEIGHT: 67 IN

## 2021-10-12 DIAGNOSIS — R79.89 LOW TESTOSTERONE: ICD-10-CM

## 2021-10-12 DIAGNOSIS — N46.01 ASPERMIA: Primary | ICD-10-CM

## 2021-10-12 PROCEDURE — 99213 OFFICE O/P EST LOW 20 MIN: CPT | Performed by: INTERNAL MEDICINE

## 2021-10-12 RX ORDER — PANTOPRAZOLE SODIUM 40 MG/1
40 TABLET, DELAYED RELEASE ORAL DAILY
COMMUNITY
End: 2022-01-20 | Stop reason: SDUPTHER

## 2021-10-12 NOTE — PROGRESS NOTES
Subjective:     Danni Quiroga presents to the office today because of problems with infertility. He recently was seen by a urologist at which time he had 2 sperm counts with no sperm found. Labs revealed elevated FSH and low normal testosterone. 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 He and his wife have been trying to get pregnant for at least a year without any success. History reviewed. No pertinent past medical history. History reviewed. No pertinent surgical history. No Known Allergies  Current Outpatient Medications   Medication Sig Dispense Refill    pantoprazole (Protonix) 40 mg tablet Take 40 mg by mouth daily. Social History     Socioeconomic History    Marital status: SINGLE     Spouse name: Not on file    Number of children: Not on file    Years of education: Not on file    Highest education level: Not on file   Tobacco Use    Smoking status: Never Smoker    Smokeless tobacco: Never Used   Vaping Use    Vaping Use: Never used   Substance and Sexual Activity    Alcohol use: No    Drug use: No     Social Determinants of Health     Financial Resource Strain:     Difficulty of Paying Living Expenses:    Food Insecurity:     Worried About Running Out of Food in the Last Year:     [address] in the Last Year:    Transportation Needs:     Lack of Transportation (Medical):  Lack of Transportation (Non-Medical):    Physical Activity:     Days of Exercise per Week:     Minutes of Exercise per Session:    Stress:     Feeling of Stress :    Social Connections:     Frequency of Communication with Friends and Family:     Frequency of Social Gatherings with Friends and Family:     Attends Faith Services:     Active Member of Clubs or Organizations:     Attends Club or Organization Meetings:     Marital Status:      History reviewed. No pertinent family history.     Review of Systems:  GEN: no weight loss, weight gain, fatigue or night sweats  CV: no PND, orthopnea, or palpitations  Resp: no dyspnea on exertion, no cough  Abd: no nausea, vomiting or diarrhea  EXT: denies edema, claudication  Endocrine: no hair loss, excessive thirst or polyuria  Neurological ROS: no TIA or stroke symptoms  ROS otherwise negative      Objective:     Visit Vitals  /70 (BP 1 Location: Right upper arm, BP Patient Position: Sitting, BP Cuff Size: Adult)   Pulse 86   Temp 98.4 °F (36.9 °C) (Oral)   Resp 20   Ht 5' 7\" (1.702 m)   Wt 180 lb (81.6 kg)   SpO2 97%   BMI 28.19 kg/m²     Body mass index is 28.19 kg/m². General:   alert, cooperative and no distress     Physical exam not performed         Assessment/Plan:     Diagnoses and all orders for this visit:    Aspermia  -     REFERRAL TO ENDOCRINOLOGY    Low testosterone        Other instructions: The patient has had difficulty for over a year as far as he and his wife getting pregnant. Sperm analysis reveals aspermia and the urology note suggests abnormal scrotum, testicular size, etc.  Hormonal levels are abnormal as well. I have recommended endocrinology evaluation for further management which she is in agreement with. We will try to arrange this and hold off on further lab testing at this time. Follow-up as needed    20-minute office visit, face-to-face, occurred today in discussion of current problem, review of urology notes and labs, discussion of possible options. Follow-up and Dispositions    · Return for as needed. Nisha Perez MD    Please note that this dictation was completed with [employer]), the computer voice recognition software. Quite often unanticipated grammatical, syntax, homophones, and other interpretive errors are inadvertently transcribed by the computer software. Please disregard these errors. Please excuse any errors that have escaped final proofreading.

## 2021-10-12 NOTE — PROGRESS NOTES
Molly García. is a 38 y.o. male presenting for Follow Up Chronic Condition  . 1. Have you been to the ER, urgent care clinic since your last visit? Hospitalized since your last visit? No    2. Have you seen or consulted any other health care providers outside of the [address] [place] [address] since your last visit? Include any pap smears or colon screening. Urologist    No flowsheet data found. No flowsheet data found. 3 most recent PHQ Screens 10/12/2021   Little interest or pleasure in doing things Not at all   Feeling down, depressed, irritable, or hopeless Not at all   Total Score PHQ 2 0       There are no discontinued medications.

## 2022-01-20 ENCOUNTER — TELEPHONE (OUTPATIENT)
Dept: INTERNAL MEDICINE CLINIC | Age: 37
End: 2022-01-20

## 2022-01-20 RX ORDER — PANTOPRAZOLE SODIUM 40 MG/1
40 TABLET, DELAYED RELEASE ORAL DAILY
Qty: 30 TABLET | Refills: 2 | Status: SHIPPED | OUTPATIENT
Start: 2022-01-20 | End: 2022-05-16

## 2022-01-20 NOTE — TELEPHONE ENCOUNTER
Patient was treated by Agnes Jackson, our nurse practitioner, for this.   He will need to make an appointment to see me next week

## 2022-01-20 NOTE — TELEPHONE ENCOUNTER
Requested Prescriptions     Pending Prescriptions Disp Refills    pantoprazole (Protonix) 40 mg tablet 30 Tablet 3     Sig: Take 1 Tablet by mouth daily.        Last Refill: 10/12/21  Last Appointment:10/12/21

## 2022-01-20 NOTE — TELEPHONE ENCOUNTER
Patient states he has had a migraine for 3 weeks. He states you prescribed methyiprednisolone acetate  And he states it is not working. He did a home covid test and it is negative. He would like an alternative medication. Please advise.      [phone]

## 2022-01-31 ENCOUNTER — OFFICE VISIT (OUTPATIENT)
Dept: INTERNAL MEDICINE CLINIC | Age: 37
End: 2022-01-31
Payer: COMMERCIAL

## 2022-01-31 VITALS
SYSTOLIC BLOOD PRESSURE: 128 MMHG | BODY MASS INDEX: 29.63 KG/M2 | RESPIRATION RATE: 20 BRPM | WEIGHT: 188.8 LBS | OXYGEN SATURATION: 96 % | TEMPERATURE: 98.2 F | DIASTOLIC BLOOD PRESSURE: 84 MMHG | HEART RATE: 97 BPM | HEIGHT: 67 IN

## 2022-01-31 DIAGNOSIS — G43.009 MIGRAINE WITHOUT AURA AND WITHOUT STATUS MIGRAINOSUS, NOT INTRACTABLE: Primary | ICD-10-CM

## 2022-01-31 PROCEDURE — 99213 OFFICE O/P EST LOW 20 MIN: CPT | Performed by: INTERNAL MEDICINE

## 2022-01-31 RX ORDER — CELECOXIB 200 MG/1
200 CAPSULE ORAL 2 TIMES DAILY
Qty: 60 CAPSULE | Refills: 2 | Status: SHIPPED | OUTPATIENT
Start: 2022-01-31 | End: 2022-03-29 | Stop reason: ALTCHOICE

## 2022-01-31 RX ORDER — BUTALBITAL AND ACETAMINOPHEN 325; 50 MG/1; MG/1
1 TABLET ORAL
Qty: 30 TABLET | Refills: 0 | Status: SHIPPED | OUTPATIENT
Start: 2022-01-31

## 2022-01-31 NOTE — PROGRESS NOTES
Subjective:     Stephanie Charles presents to the office today with complaints of headaches. Patient states that he started having headaches when he was much younger but he would go long stretches without having headaches. He had not had headaches for 2 to 3 years until approximately 6 months ago when he began to have 2-3 headaches per week. He notes that the headaches are usually in the bitemporal region and radiate into his eyes. He notes that headaches can be throbbing but also constant. They do not radiate to the back of his neck or head. The patient has had no aura. He has taken Excedrin Migraine in the past which upset his stomach. He has no other neurological symptoms. He can have the headaches during the day as well as of the night. He notes that his mother had migraine headaches. History reviewed. No pertinent past medical history. History reviewed. No pertinent surgical history. No Known Allergies  Current Outpatient Medications   Medication Sig Dispense Refill    butalbitaL-acetaminophen (PHRENILIN)  mg tablet Take 1 Tablet by mouth every six (6) hours as needed (headache). 30 Tablet 0    celecoxib (CELEBREX) 200 mg capsule Take 1 Capsule by mouth two (2) times a day for 90 days. 60 Capsule 2    pantoprazole (Protonix) 40 mg tablet Take 1 Tablet by mouth daily. 30 Tablet 2     Social History     Socioeconomic History    Marital status: SINGLE   Tobacco Use    Smoking status: Never Smoker    Smokeless tobacco: Never Used   Vaping Use    Vaping Use: Never used   Substance and Sexual Activity    Alcohol use: No    Drug use: No     History reviewed. No pertinent family history.     Review of Systems:  GEN: no weight loss, weight gain, fatigue or night sweats  CV: no PND, orthopnea, or palpitations  Neck: Denies neck pain or decrease in range of motion  Resp: no dyspnea on exertion, no cough  Abd: no nausea, vomiting or diarrhea  EXT: denies edema, claudication  Endocrine: no hair loss, excessive thirst or polyuria  Neurological ROS: no TIA or stroke symptoms  ROS otherwise negative      Objective:     Visit Vitals  /84 (BP 1 Location: Right upper arm, BP Patient Position: Sitting, BP Cuff Size: Adult)   Pulse 97   Temp 98.2 °F (36.8 °C) (Oral)   Resp 20   Ht 5' 7\" (1.702 m)   Wt 188 lb 12.8 oz (85.6 kg)   SpO2 96%   BMI 29.57 kg/m²     Body mass index is 29.57 kg/m². General:   alert, cooperative and no distress   Eyes: conjunctivae/sclerae clear. PERRL, EOM's intact   Mouth:  No oral lesions, no pharyngeal erythema, no exudates   Neck:  Supple without pain in the posterior neck muscles or along the occipital ridge   Heart: S1 and S2 normal,no murmurs noted    Lungs: Clear to auscultation bilaterally, no increased work of breathing   Abdomen: Soft, nontender. Normal bowel sounds   Extremities: No edema or cyanosis   Neuro: ..alert, oriented x3,speech normal in context and clarity, cranial nerves II-XII intact,motor strength: full proximally and distally,gait: normal  reflexes: full and symmetric     Physical exam otherwise negative         Assessment/Plan:     Diagnoses and all orders for this visit:    Migraine without aura and without status migrainosus, not intractable  -     butalbitaL-acetaminophen (PHRENILIN)  mg tablet; Take 1 Tablet by mouth every six (6) hours as needed (headache). , Normal, Disp-30 Tablet, R-0  -     celecoxib (CELEBREX) 200 mg capsule; Take 1 Capsule by mouth two (2) times a day for 90 days. , Normal, Disp-60 Capsule, R-2        Other instructions: The patient's medications were reviewed and reconciled. We will start on daily Celebrex 200 mg daily and supplement this with Phrenilin every 6 hours as needed for headache. If there is no improvement have recommended a neurological consultation    Follow-up and Dispositions    · Return if symptoms worsen or fail to improve.          Yang Guallpa MD    Please note that this dictation was completed with Dragon, the computer voice recognition software. Quite often unanticipated grammatical, syntax, homophones, and other interpretive errors are inadvertently transcribed by the computer software. Please disregard these errors. Please excuse any errors that have escaped final proofreading.

## 2022-01-31 NOTE — PROGRESS NOTES
Newman Lucretia. is a 39 y.o. male presenting for Migraine  . 1. Have you been to the ER, urgent care clinic since your last visit? Hospitalized since your last visit? No    2. Have you seen or consulted any other health care providers outside of the [address] [place] [address] since your last visit? Include any pap smears or colon screening. No    No flowsheet data found. No flowsheet data found. 3 most recent PHQ Screens 10/12/2021   Little interest or pleasure in doing things Not at all   Feeling down, depressed, irritable, or hopeless Not at all   Total Score PHQ 2 0       There are no discontinued medications.

## 2022-03-19 PROBLEM — R79.89 LOW TESTOSTERONE: Status: ACTIVE | Noted: 2021-10-12

## 2022-03-19 PROBLEM — N46.01 ASPERMIA: Status: ACTIVE | Noted: 2021-10-12

## 2022-03-19 PROBLEM — G43.009 MIGRAINE WITHOUT AURA AND WITHOUT STATUS MIGRAINOSUS, NOT INTRACTABLE: Status: ACTIVE | Noted: 2022-01-31

## 2022-03-20 PROBLEM — K21.9 GERD WITHOUT ESOPHAGITIS: Status: ACTIVE | Noted: 2019-05-15

## 2022-03-28 ENCOUNTER — TELEPHONE (OUTPATIENT)
Dept: INTERNAL MEDICINE CLINIC | Age: 37
End: 2022-03-28

## 2022-03-28 NOTE — TELEPHONE ENCOUNTER
----- Message from Cecilio Pichardo sent at 3/28/2022 10:08 AM EDT -----  Subject: Message to Provider    QUESTIONS  Information for Provider? Pt wife is calling stating that he has a surgery   the 12th of April and his blood pressure has been up and down and last   read was the 25th 150/90 and was wondering if he could be brought in for a   in person appointment sooner than the 4tth of April which is a VV and   wants to get check out please call PT   ---------------------------------------------------------------------------  --------------  [address]  What is the best way for the office to contact you? OK to leave message on   voicemail  Preferred Call Back Phone Number? [phone]  ---------------------------------------------------------------------------  --------------  SCRIPT ANSWERS  Relationship to Patient? Other  Representative Name? Homa Zavala  Is the Representative on the appropriate HIPAA document in Epic?  Yes

## 2022-03-29 ENCOUNTER — OFFICE VISIT (OUTPATIENT)
Dept: INTERNAL MEDICINE CLINIC | Age: 37
End: 2022-03-29
Payer: COMMERCIAL

## 2022-03-29 VITALS
TEMPERATURE: 97.9 F | HEIGHT: 67 IN | OXYGEN SATURATION: 97 % | WEIGHT: 185.4 LBS | SYSTOLIC BLOOD PRESSURE: 144 MMHG | HEART RATE: 70 BPM | RESPIRATION RATE: 18 BRPM | DIASTOLIC BLOOD PRESSURE: 104 MMHG | BODY MASS INDEX: 29.1 KG/M2

## 2022-03-29 DIAGNOSIS — I10 PRIMARY HYPERTENSION: Primary | Chronic | ICD-10-CM

## 2022-03-29 PROCEDURE — 99213 OFFICE O/P EST LOW 20 MIN: CPT | Performed by: INTERNAL MEDICINE

## 2022-03-29 RX ORDER — LOSARTAN POTASSIUM 50 MG/1
50 TABLET ORAL DAILY
Qty: 30 TABLET | Refills: 0 | Status: SHIPPED | OUTPATIENT
Start: 2022-03-29 | End: 2022-04-11

## 2022-03-29 NOTE — LETTER
3/29/2022         RE: Mr. Jacki Chavira. [address]    [address]    To Whom It May Concern: The above named patient was seen by me today regarding preoperative clearance for surgery scheduled for 2022. The patient's blood pressure has be consistently elevated by history and this was confirmed during today's office visit. Treatment with losartan has been started and he will need to return for follow up in 2 weeks. His surgery should be postponed until such time that his blood pressure is controlled and he is stable for surgery. Please contact me should you have any further questions.     Respectfully,                  Harvey Schwab MD

## 2022-03-29 NOTE — PATIENT INSTRUCTIONS
DASH Diet: Care Instructions  Your Care Instructions     The DASH diet is an eating plan that can help lower your blood pressure. DASH stands for Dietary Approaches to Stop Hypertension. Hypertension is high blood pressure. The DASH diet focuses on eating foods that are high in calcium, potassium, and magnesium. These nutrients can lower blood pressure. The foods that are highest in these nutrients are fruits, vegetables, low-fat dairy products, nuts, seeds, and legumes. But taking calcium, potassium, and magnesium supplements instead of eating foods that are high in those nutrients does not have the same effect. The DASH diet also includes whole grains, fish, and poultry. The DASH diet is one of several lifestyle changes your doctor may recommend to lower your high blood pressure. Your doctor may also want you to decrease the amount of sodium in your diet. Lowering sodium while following the DASH diet can lower blood pressure even further than just the DASH diet alone. Follow-up care is a key part of your treatment and safety. Be sure to make and go to all appointments, and call your doctor if you are having problems. It's also a good idea to know your test results and keep a list of the medicines you take. How can you care for yourself at home? Following the DASH diet  · Eat 4 to 5 servings of fruit each day. A serving is 1 medium-sized piece of fruit, ½ cup chopped or canned fruit, 1/4 cup dried fruit, or 4 ounces (½ cup) of fruit juice. Choose fruit more often than fruit juice. · Eat 4 to 5 servings of vegetables each day. A serving is 1 cup of lettuce or raw leafy vegetables, ½ cup of chopped or cooked vegetables, or 4 ounces (½ cup) of vegetable juice. Choose vegetables more often than vegetable juice. · Get 2 to 3 servings of low-fat and fat-free dairy each day. A serving is 8 ounces of milk, 1 cup of yogurt, or 1 ½ ounces of cheese. · Eat 6 to 8 servings of grains each day.  A serving is 1 slice of bread, 1 ounce of dry cereal, or ½ cup of cooked rice, pasta, or cooked cereal. Try to choose whole-grain products as much as possible. · Limit lean meat, poultry, and fish to 2 servings each day. A serving is 3 ounces, about the size of a deck of cards. · Eat 4 to 5 servings of nuts, seeds, and legumes (cooked dried beans, lentils, and split peas) each week. A serving is 1/3 cup of nuts, 2 tablespoons of seeds, or ½ cup of cooked beans or peas. · Limit fats and oils to 2 to 3 servings each day. A serving is 1 teaspoon of vegetable oil or 2 tablespoons of salad dressing. · Limit sweets and added sugars to 5 servings or less a week. A serving is 1 tablespoon jelly or jam, ½ cup sorbet, or 1 cup of lemonade. · Eat less than 2,300 milligrams (mg) of sodium a day. If you limit your sodium to 1,500 mg a day, you can lower your blood pressure even more. · Be aware that all of these are the suggested number of servings for people who eat 1,800 to 2,000 calories a day. Your recommended number of servings may be different if you need more or fewer calories. Tips for success  · Start small. Do not try to make dramatic changes to your diet all at once. You might feel that you are missing out on your favorite foods and then be more likely to not follow the plan. Make small changes, and stick with them. Once those changes become habit, add a few more changes. · Try some of the following:  ? Make it a goal to eat a fruit or vegetable at every meal and at snacks. This will make it easy to get the recommended amount of fruits and vegetables each day. ? Try yogurt topped with fruit and nuts for a snack or healthy dessert. ? Add lettuce, tomato, cucumber, and onion to sandwiches. ? Combine a ready-made pizza crust with low-fat mozzarella cheese and lots of vegetable toppings. Try using tomatoes, squash, spinach, broccoli, carrots, cauliflower, and onions. ?  Have a variety of cut-up vegetables with a low-fat dip as an appetizer instead of chips and dip. ? Sprinkle sunflower seeds or chopped almonds over salads. Or try adding chopped walnuts or almonds to cooked vegetables. ? Try some vegetarian meals using beans and peas. Add garbanzo or kidney beans to salads. Make burritos and tacos with mashed mancia beans or black beans. Where can you learn more? Go to http://www.kirk.com/  Enter H967 in the search box to learn more about \"DASH Diet: Care Instructions. \"  Current as of: January 10, 2022               Content Version: 13.2  © 2822-7045 [employer]. Care instructions adapted under license by [employer] (which disclaims liability or warranty for this information). If you have questions about a medical condition or this instruction, always ask your healthcare professional. [address] any warranty or liability for your use of this information.

## 2022-03-29 NOTE — PROGRESS NOTES
Chief Complaint   Patient presents with    Blood Pressure Check     Pt is having a left variocele w/ arron Frost on 4/12/22. Pt states they want to know if procedure needs to be postponed or if pt needs to be put on medication. 1. \"Have you been to the ER, urgent care clinic since your last visit? Hospitalized since your last visit? \" No    2. \"Have you seen or consulted any other health care providers outside of the [address] [place] [address] since your last visit? \" Yes When: Londa Buerger pt has procedure to be done on 4/12/22      3. For patients aged 39-70: Has the patient had a colonoscopy / FIT/ Cologuard? NA - based on age      If the patient is female:    4. For patients aged 41-77: Has the patient had a mammogram within the past 2 years? NA - based on age or sex      11. For patients aged 21-65: Has the patient had a pap smear?  NA - based on age or sex    Visit Vitals  /84 (BP 1 Location: Right arm, BP Patient Position: Sitting)   Pulse 70   Temp 97.9 °F (36.6 °C) (Oral)   Resp 18   Ht 5' 7\" (1.702 m)   Wt 185 lb 6.4 oz (84.1 kg)   SpO2 97%   BMI 29.04 kg/m²

## 2022-03-29 NOTE — PROGRESS NOTES
Subjective:     Annmarie Mallory is a 26-year-old male who presents to the office today with complaints of elevated blood pressure. The patient had tentatively been scheduled for a left varicocele repair with pesa/tese to be done by a Dr. Hermann Boyd on April 12th at the [facility] in [facility] due to problems with infertility. The patient recently had been checking his blood pressures and found that his blood pressures were consistently elevated. The patient has had no chest pain, shortness of breath, dizzy spells or strokelike symptoms. He does not use excessive amounts of salt, is not on any type of decongestant and has no family history of hypertension. The patient understands that he cannot be cleared for surgery until his blood pressure is better managed. No past medical history on file. No past surgical history on file. No Known Allergies  Current Outpatient Medications   Medication Sig Dispense Refill    losartan (COZAAR) 50 mg tablet Take 1 Tablet by mouth daily. 30 Tablet 0    butalbitaL-acetaminophen (PHRENILIN)  mg tablet Take 1 Tablet by mouth every six (6) hours as needed (headache). 30 Tablet 0    pantoprazole (Protonix) 40 mg tablet Take 1 Tablet by mouth daily. 30 Tablet 2     Social History     Socioeconomic History    Marital status: SINGLE   Tobacco Use    Smoking status: Never Smoker    Smokeless tobacco: Never Used   Vaping Use    Vaping Use: Never used   Substance and Sexual Activity    Alcohol use: No    Drug use: No     No family history on file.     Review of Systems:  GEN: no weight loss, weight gain, fatigue or night sweats  CV: no PND, orthopnea, or palpitations  Resp: no dyspnea on exertion, no cough  Abd: no nausea, vomiting or diarrhea  EXT: denies edema, claudication  Endocrine: no hair loss, excessive thirst or polyuria  Neurological ROS: no TIA or stroke symptoms  ROS otherwise negative      Objective:     Visit Vitals  BP (!) 144/104 (BP 1 Location: Left arm)   Pulse 70   Temp 97.9 °F (36.6 °C) (Oral)   Resp 18   Ht 5' 7\" (1.702 m)   Wt 185 lb 6.4 oz (84.1 kg)   SpO2 97%   BMI 29.04 kg/m²     Body mass index is 29.04 kg/m². General:   alert, cooperative and no distress   Eyes: conjunctivae/sclerae clear. PERRL, EOM's intact   Mouth:  No oral lesions, no pharyngeal erythema, no exudates   Neck: Trachea midline, no thyromegaly, no bruits   Heart: S1 and S2 normal,no murmurs noted    Lungs: Clear to auscultation bilaterally, no increased work of breathing   Abdomen: Soft, nontender. Normal bowel sounds   Extremities: No edema or cyanosis   Neuro: ..alert, oriented x3,speech normal in context and clarity, cranial nerves II-XII intact,motor strength: full proximally and distally,gait: normal  reflexes: full and symmetric     Physical exam otherwise negative         Assessment/Plan:     Diagnoses and all orders for this visit:    Primary hypertension  -     losartan (COZAAR) 50 mg tablet; Take 1 Tablet by mouth daily. , Normal, Disp-30 Tablet, R-0  -     COLLECTION VENOUS BLOOD,VENIPUNCTURE  -     CBC WITH AUTOMATED DIFF; Future  -     METABOLIC PANEL, COMPREHENSIVE; Future        Other instructions: The patient had consistently elevated blood pressures in both arms during the course of the office visit. A no added salt diet is encouraged    We will start him on losartan 50 mg daily    He will need to be rechecked in 2 weeks with medication to be adjusted accordingly. Once his blood pressure is improved preoperative clearance will need to be given. A CBC and CMP have been sent. A letter asking for his surgery to be canceled and rescheduled has been given to the patient today. Follow-up and Dispositions    · Return in about 2 weeks (around 4/12/2022). Charito Rayo MD    Please note that this dictation was completed with [employer], the [employer] voice recognition software.   Quite often unanticipated grammatical, syntax, homophones, and other interpretive errors are inadvertently transcribed by the computer software. Please disregard these errors. Please excuse any errors that have escaped final proofreading.

## 2022-03-30 LAB
ALBUMIN SERPL-MCNC: 4.8 G/DL (ref 4–5)
ALBUMIN/GLOB SERPL: 1.7 {RATIO} (ref 1.2–2.2)
ALP SERPL-CCNC: 68 IU/L (ref 44–121)
ALT SERPL-CCNC: 22 IU/L (ref 0–44)
AST SERPL-CCNC: 22 IU/L (ref 0–40)
BASOPHILS # BLD AUTO: 0.1 X10E3/UL (ref 0–0.2)
BASOPHILS NFR BLD AUTO: 1 %
BILIRUB SERPL-MCNC: 0.9 MG/DL (ref 0–1.2)
BUN SERPL-MCNC: 8 MG/DL (ref 6–20)
BUN/CREAT SERPL: 8 (ref 9–20)
CALCIUM SERPL-MCNC: 10.1 MG/DL (ref 8.7–10.2)
CHLORIDE SERPL-SCNC: 103 MMOL/L (ref 96–106)
CO2 SERPL-SCNC: 23 MMOL/L (ref 20–29)
CREAT SERPL-MCNC: 1.05 MG/DL (ref 0.76–1.27)
EGFR: 94 ML/MIN/1.73
EOSINOPHIL # BLD AUTO: 0.1 X10E3/UL (ref 0–0.4)
EOSINOPHIL NFR BLD AUTO: 1 %
ERYTHROCYTE [DISTWIDTH] IN BLOOD BY AUTOMATED COUNT: 12.8 % (ref 11.6–15.4)
GLOBULIN SER CALC-MCNC: 2.8 G/DL (ref 1.5–4.5)
GLUCOSE SERPL-MCNC: 88 MG/DL (ref 65–99)
HCT VFR BLD AUTO: 43.4 % (ref 37.5–51)
HGB BLD-MCNC: 14.8 G/DL (ref 13–17.7)
IMM GRANULOCYTES # BLD AUTO: 0.1 X10E3/UL (ref 0–0.1)
IMM GRANULOCYTES NFR BLD AUTO: 1 %
LYMPHOCYTES # BLD AUTO: 4.8 X10E3/UL (ref 0.7–3.1)
LYMPHOCYTES NFR BLD AUTO: 39 %
MCH RBC QN AUTO: 30.4 PG (ref 26.6–33)
MCHC RBC AUTO-ENTMCNC: 34.1 G/DL (ref 31.5–35.7)
MCV RBC AUTO: 89 FL (ref 79–97)
MONOCYTES # BLD AUTO: 0.8 X10E3/UL (ref 0.1–0.9)
MONOCYTES NFR BLD AUTO: 7 %
NEUTROPHILS # BLD AUTO: 6.4 X10E3/UL (ref 1.4–7)
NEUTROPHILS NFR BLD AUTO: 51 %
PLATELET # BLD AUTO: 423 X10E3/UL (ref 150–450)
POTASSIUM SERPL-SCNC: 4 MMOL/L (ref 3.5–5.2)
PROT SERPL-MCNC: 7.6 G/DL (ref 6–8.5)
RBC # BLD AUTO: 4.87 X10E6/UL (ref 4.14–5.8)
SODIUM SERPL-SCNC: 141 MMOL/L (ref 134–144)
WBC # BLD AUTO: 12.3 X10E3/UL (ref 3.4–10.8)

## 2022-04-06 ENCOUNTER — TELEPHONE (OUTPATIENT)
Dept: INTERNAL MEDICINE CLINIC | Age: 37
End: 2022-04-06

## 2022-04-06 RX ORDER — HYDROCHLOROTHIAZIDE 25 MG/1
25 TABLET ORAL DAILY
Qty: 30 TABLET | Refills: 0 | Status: SHIPPED | OUTPATIENT
Start: 2022-04-06

## 2022-04-06 NOTE — TELEPHONE ENCOUNTER
Stop losartan. Start hydrochlorothiazide 25 mg daily. Will need blood pressure recheck 2 weeks after the hydrochlorothiazide is started.

## 2022-04-06 NOTE — TELEPHONE ENCOUNTER
Patient's wife called and stated that her  is having a reaction to the losartan. He began having a dry cough a few days ago and is requesting an alternative medication called in to try instead. Please send alternative to [facility] on [address] in [place].

## 2022-04-06 NOTE — TELEPHONE ENCOUNTER
Wife advised- please send pended Rx to pharmacy:  Requested Prescriptions     Pending Prescriptions Disp Refills    hydroCHLOROthiazide (HYDRODIURIL) 25 mg tablet 30 Tablet 0     Sig: Take 1 Tablet by mouth daily.

## 2022-04-12 ENCOUNTER — OFFICE VISIT (OUTPATIENT)
Dept: INTERNAL MEDICINE CLINIC | Age: 37
End: 2022-04-12
Payer: COMMERCIAL

## 2022-04-12 VITALS
BODY MASS INDEX: 28.09 KG/M2 | HEART RATE: 98 BPM | WEIGHT: 179 LBS | HEIGHT: 67 IN | SYSTOLIC BLOOD PRESSURE: 136 MMHG | DIASTOLIC BLOOD PRESSURE: 90 MMHG | TEMPERATURE: 98.3 F | OXYGEN SATURATION: 98 %

## 2022-04-12 DIAGNOSIS — Z01.818 PREOPERATIVE CLEARANCE: ICD-10-CM

## 2022-04-12 DIAGNOSIS — K21.9 GASTROESOPHAGEAL REFLUX DISEASE WITHOUT ESOPHAGITIS: ICD-10-CM

## 2022-04-12 DIAGNOSIS — Z00.00 ROUTINE ADULT HEALTH MAINTENANCE: Primary | ICD-10-CM

## 2022-04-12 DIAGNOSIS — I10 PRIMARY HYPERTENSION: ICD-10-CM

## 2022-04-12 DIAGNOSIS — Z11.59 NEED FOR HEPATITIS C SCREENING TEST: ICD-10-CM

## 2022-04-12 PROCEDURE — 93000 ELECTROCARDIOGRAM COMPLETE: CPT | Performed by: INTERNAL MEDICINE

## 2022-04-12 PROCEDURE — 99214 OFFICE O/P EST MOD 30 MIN: CPT | Performed by: INTERNAL MEDICINE

## 2022-04-12 NOTE — PROGRESS NOTES
Chief Complaint   Patient presents with    Follow-up     Visit Vitals  BP (!) 136/90 (BP 1 Location: Left upper arm, BP Patient Position: Sitting, BP Cuff Size: Adult)   Pulse 98   Temp 98.3 °F (36.8 °C) (Oral)   Ht 5' 7\" (1.702 m)   Wt 179 lb (81.2 kg)   SpO2 98%   BMI 28.04 kg/m²         1. Have you been to the ER, urgent care clinic since your last visit? Hospitalized since your last visit? No    2. Have you seen or consulted any other health care providers outside of the [address] [place] [address] since your last visit? Include any pap smears or colon screening.  No

## 2022-04-12 NOTE — PROGRESS NOTES
Subjective:     Chief Complaint   Patient presents with   Simone Sawant. is a 39 y.o. M. He was last seen by his primary care provider, Dr. Keily Yang, in late April. The patient had previously been tentatively scheduled for a left varicocele repair at the [facility] in [facility] because of problems associated with infertility. The patient was noted to have been recently checking his blood pressure readings at home with findings of consistently elevated blood pressures. Physical examination at the time was notable for a blood pressure of 144/104 mmHg. A low-salt diet was encouraged, and he was started on losartan 50 mg daily. It appears that the patient had discontinued this on his own, and continued with hydrochlorothiazide 25 mg daily. Today, the patient comes in for preoperative evaluation pending a hydrocele repair and TESE procedure in May by his urology team.  He reports feeling well overall today and has no significant acute somatic complaints. He continues on hydrochlorothiazide for blood pressure control, and typically at home gets readings in the 120/80 mmHg range. Over the past year, there has been no interval change in his health status. He denies any recent chest pain, shortness of breath, or any other cardiopulmonary symptoms. No fevers or chills or other constitutional complaints. He denies any personal history of myocardial infarction, coronary artery disease, diabetes mellitus, kidney disease, heart failure, or stroke. 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 No lightheadedness or dizziness with current medication regimen. Overall, pantoprazole continues to be helpful for his heartburn. His review of systems is otherwise negative.     Past Medical History:  Past Medical History:   Diagnosis Date    Aspermia     GERD (gastroesophageal reflux disease)     RX = Pantoprazole    Hypertension     RX = HCTZ 25 mg/d    Migraines     Overweight        Past Surgical Histor:  No past surgical history on file. Allergies:  No Known Allergies    Medications:  Current Outpatient Medications   Medication Sig Dispense Refill    hydroCHLOROthiazide (HYDRODIURIL) 25 mg tablet Take 1 Tablet by mouth daily. 30 Tablet 0    butalbitaL-acetaminophen (PHRENILIN)  mg tablet Take 1 Tablet by mouth every six (6) hours as needed (headache). 30 Tablet 0    pantoprazole (Protonix) 40 mg tablet Take 1 Tablet by mouth daily. 30 Tablet 2       Social History:  Social History     Socioeconomic History    Marital status: SINGLE   Tobacco Use    Smoking status: Never Smoker    Smokeless tobacco: Never Used   Vaping Use    Vaping Use: Never used   Substance and Sexual Activity    Alcohol use: No    Drug use: No       Family History:  No family history on file. Immunizations:  Immunization History   Administered Date(s) Administered    Tdap 08/11/2016        Healthcare Maintenance:  Health Maintenance   Topic Date Due    Hepatitis C Screening  Never done    COVID-19 Vaccine (1) Never done    Flu Vaccine (Season Ended) 09/01/2022    Depression Screen  03/29/2023    DTaP/Tdap/Td series (2 - Td or Tdap) 08/11/2026    Pneumococcal 0-64 years  Aged Out        Review of Systems:  ROS:  Review of Systems   Constitutional: Negative. HENT: Negative. Eyes: Negative. Respiratory: Negative. Cardiovascular: Negative. Gastrointestinal: Negative. Genitourinary: Negative. Musculoskeletal: Negative. Skin: Negative. Neurological: Negative. Endo/Heme/Allergies: Negative. Psychiatric/Behavioral: Negative.        ROS otherwise negative      Objective:     Vital Signs:  Visit Vitals  BP (!) 136/90 (BP 1 Location: Left upper arm, BP Patient Position: Sitting, BP Cuff Size: Adult)   Pulse 98   Temp 98.3 °F (36.8 °C) (Oral)   Ht 5' 7\" (1.702 m)   Wt 179 lb (81.2 kg)   SpO2 98%   BMI 28.04 kg/m²       BMI:  Body mass index is 28.04 kg/m². Physical Examination:  Physical Exam  Vitals reviewed. Constitutional:       Appearance: Normal appearance. HENT:      Head: Normocephalic and atraumatic. Nose: Nose normal.      Mouth/Throat:      Mouth: Mucous membranes are moist.   Eyes:      Extraocular Movements: Extraocular movements intact. Conjunctiva/sclera: Conjunctivae normal.      Pupils: Pupils are equal, round, and reactive to light. Cardiovascular:      Rate and Rhythm: Normal rate and regular rhythm. Pulses: Normal pulses. Heart sounds: Normal heart sounds. No murmur heard. No friction rub. No gallop. Pulmonary:      Effort: Pulmonary effort is normal. No respiratory distress. Breath sounds: Normal breath sounds. No wheezing, rhonchi or rales. Abdominal:      General: Bowel sounds are normal. There is no distension. Palpations: Abdomen is soft. There is no mass. Tenderness: There is no abdominal tenderness. There is no guarding or rebound. Musculoskeletal:         General: No tenderness, deformity or signs of injury. Normal range of motion. Cervical back: Normal range of motion and neck supple. Right lower leg: No edema. Left lower leg: No edema. Skin:     General: Skin is warm and dry. Findings: No bruising, lesion or rash. Neurological:      General: No focal deficit present. Mental Status: He is alert and oriented to person, place, and time. Mental status is at baseline. Cranial Nerves: No cranial nerve deficit. Sensory: No sensory deficit. Motor: No weakness.       Coordination: Coordination normal.      Gait: Gait normal.      Deep Tendon Reflexes: Reflexes normal.   Psychiatric:         Mood and Affect: Mood normal.         Behavior: Behavior normal.          Physical exam otherwise negative    Diagnostic Testing:        NSR, no ischemic changes    Laboratory Studies:  Office Visit on 03/29/2022   Component Date Value Ref Range Status    Glucose 03/29/2022 88  65 - 99 mg/dL Final    BUN 03/29/2022 8  6 - 20 mg/dL Final    Creatinine 03/29/2022 1.05  0.76 - 1.27 mg/dL Final    eGFR 03/29/2022 94  >59 mL/min/1.73 Final    BUN/Creatinine ratio 03/29/2022 8* 9 - 20 Final    Sodium 03/29/2022 141  134 - 144 mmol/L Final    Potassium 03/29/2022 4.0  3.5 - 5.2 mmol/L Final    Chloride 03/29/2022 103  96 - 106 mmol/L Final    CO2 03/29/2022 23  20 - 29 mmol/L Final    Calcium 03/29/2022 10.1  8.7 - 10.2 mg/dL Final    Protein, total 03/29/2022 7.6  6.0 - 8.5 g/dL Final    Albumin 03/29/2022 4.8  4.0 - 5.0 g/dL Final    GLOBULIN, TOTAL 03/29/2022 2.8  1.5 - 4.5 g/dL Final    A-G Ratio 03/29/2022 1.7  1.2 - 2.2 Final    Bilirubin, total 03/29/2022 0.9  0.0 - 1.2 mg/dL Final    Alk. phosphatase 03/29/2022 68  44 - 121 IU/L Final    AST (SGOT) 03/29/2022 22  0 - 40 IU/L Final    ALT (SGPT) 03/29/2022 22  0 - 44 IU/L Final    WBC 03/29/2022 12.3* 3.4 - 10.8 x10E3/uL Final    RBC 03/29/2022 4.87  4.14 - 5.80 x10E6/uL Final    HGB 03/29/2022 14.8  13.0 - 17.7 g/dL Final    HCT 03/29/2022 43.4  37.5 - 51.0 % Final    MCV 03/29/2022 89  79 - 97 fL Final    MCH 03/29/2022 30.4  26.6 - 33.0 pg Final    MCHC 03/29/2022 34.1  31.5 - 35.7 g/dL Final    RDW 03/29/2022 12.8  11.6 - 15.4 % Final    PLATELET 19/16/8095 612  150 - 450 x10E3/uL Final    NEUTROPHILS 03/29/2022 51  Not Estab. % Final    Lymphocytes 03/29/2022 39  Not Estab. % Final    MONOCYTES 03/29/2022 7  Not Estab. % Final    EOSINOPHILS 03/29/2022 1  Not Estab. % Final    BASOPHILS 03/29/2022 1  Not Estab. % Final    ABS. NEUTROPHILS 03/29/2022 6.4  1.4 - 7.0 x10E3/uL Final    Abs Lymphocytes 03/29/2022 4.8* 0.7 - 3.1 x10E3/uL Final    ABS. MONOCYTES 03/29/2022 0.8  0.1 - 0.9 x10E3/uL Final    ABS.  EOSINOPHILS 03/29/2022 0.1  0.0 - 0.4 x10E3/uL Final    ABS. BASOPHILS 03/29/2022 0.1  0.0 - 0.2 x10E3/uL Final    IMMATURE GRANULOCYTES 03/29/2022 1  Not Estab. % Final    ABS. IMM. GRANS. 03/29/2022 0.1  0.0 - 0.1 x10E3/uL Final         Radiographic Studies:  XR Results (most recent):  No results found for this or any previous visit. CAMI Results (most recent):  No results found for this or any previous visit. CT Results (most recent):  Results from Hospital Encounter encounter on 09/29/15    CT ABD PELV W CONT    Narrative  **Final Report**      ICD Codes / Adm. Diagnosis: 376319   / Flank Pain  side pain, vomiting  Examination:  CT ABD PELVIS W CON  - [id number] - Sep 29 2015  6:36PM  Accession No:  [id number]  Reason:  RLQ pain      REPORT:  INDICATION:   Right abdominal pain    COMPARISON:  None    TECHNIQUE: Thin collimation axial images were obtained through the abdomen  and pelvis with intravenous contrast administration. Multiplanar  reconstructions were generated. FINDINGS:    Abdomen:    No liver abnormality. No biliary ductal dilatation. No calcified gallstone. No abnormality of the spleen, pancreas, or adrenal glands. Normal symmetric nephrograms without focal lesion. Small 2 mm obstructing  calculus in the proximal right ureter (image 50) causing minimal right  hydronephrosis. No other evidence for obstructing renal calculus. There are  small nonobstructing right renal calculi as well. No left hydronephrosis. No abdominal lymphadenopathy or ascites. Pelvis:    Normal appendix. No evidence for acute bowel abnormality or obstruction. No pelvic lymphadenopathy or free fluid. The bladder is unremarkable. No acute osseous abnormality. Impression  :    Small 2 mm obstructing calculus in the proximal right ureter causing minimal  hydronephrosis.           Signing/Reading Doctor: David Diaz [facility] ([id number])  Zayra Carlson [facility] ([id number])  Sep 29 2015  7:08PM     DEXA Results (most recent):  No results found for this or any previous visit. MRI Results (most recent):  No results found for this or any previous visit. Assessment/Plan:       ICD-10-CM ICD-9-CM    1. Routine adult health maintenance  Z00.00 V70.0    2. Preoperative clearance  Z01.818 V72.84 AMB POC EKG ROUTINE W/ 12 LEADS, INTER & REP   3. Primary hypertension  I10 401.9    4. Gastroesophageal reflux disease without esophagitis  K21.9 530.81    5. Need for hepatitis C screening test  Z11.59 V73.89 HEPATITIS C AB          This very pleasant 48-year-old white male has hypertension. He has pending a urological procedure next month. He is here for preoperative evaluation. His blood pressure is well controlled today. Recent laboratory studies reviewed; no significant abnormality. EKG today WNL. Pre-Operative Risk Assessment Using 2014 ACC/AHA Guidelines     Emergent procedure: No  Active Cardiac Condition including decompensated HF, Arrhythmia, MI <3 weeks, severe valve disease: No  Risk Level of Procedure: Intermediate Risk (intraperitoneal, intrathoracic, HENT, orthopedic, or carotid endarterectomy, etc.)  Revised Cardiac Risk Index Risk factors: None  Measurement of Exercise Tolerance before Surgery >4 METS (climbing > 1 flight of stairs without stopping, walking up hill > 1-2 blocks, scrubbing floors, moving furniture, golf, bowling, dancing or tennis, or running short distance): Yes    According to the 2014 ACC/AHA pre-operative risk assessment guidelines Lyndon David. is at low risk for major cardiac complications during a Intermediate Risk procedure, exercise tolerance is >4 METS  and procedure may proceed as planned. Specific medication recommendations are listed below.     Request further recommendations from consultants: None    Medication Recommendations (taken with a sip of water even when NPO):  Diuretics Hold one dose prior to surgery     Hypertension:  -Well-controlled at target less than 140/90 mmHg  -At next visit, if this continues to be a concern even after his surgery, consider referring patient for laboratory evaluation to rule out secondary forms of hypertension  -Consider sleep study as well    GERD:  -Asymptomatic, well controlled currently with pantoprazole  -Continuing with current care for now, plan on revisiting this with patient at next visit, consider trial of discontinuation or transition to as needed use of PPI    HCM:  - HCV Ab screening today. Follow-up with me in 3 months. Lilibeth Rodrigues MD    Please note that this dictation was completed with [employer], the computer voice recognition software. Quite often unanticipated grammatical, syntax, homophones, and other interpretive errors are inadvertently transcribed by the computer software. Please disregard these errors. Please excuse any errors that have escaped final proofreading.

## 2022-04-13 LAB — HCV AB S/CO SERPL IA: <0.1 S/CO RATIO (ref 0–0.9)

## 2022-04-13 NOTE — PROGRESS NOTES
Please contact this patient to let them know that the results of this recent test was unremarkable. Please have them schedule routine follow-up with me as necessary.

## 2022-05-12 DIAGNOSIS — K21.9 GASTRO-ESOPHAGEAL REFLUX DISEASE WITHOUT ESOPHAGITIS: ICD-10-CM

## 2022-05-12 DIAGNOSIS — K44.9 DIAPHRAGMATIC HERNIA WITHOUT OBSTRUCTION OR GANGRENE: ICD-10-CM

## 2022-05-16 RX ORDER — PANTOPRAZOLE SODIUM 40 MG/1
TABLET, DELAYED RELEASE ORAL
Qty: 90 TABLET | Refills: 1 | Status: SHIPPED | OUTPATIENT
Start: 2022-05-16

## 2023-04-28 ENCOUNTER — OFFICE VISIT (OUTPATIENT)
Dept: INTERNAL MEDICINE CLINIC | Age: 38
End: 2023-04-28

## 2023-04-28 VITALS
RESPIRATION RATE: 18 BRPM | BODY MASS INDEX: 28.5 KG/M2 | HEIGHT: 67 IN | DIASTOLIC BLOOD PRESSURE: 84 MMHG | WEIGHT: 181.6 LBS | HEART RATE: 97 BPM | SYSTOLIC BLOOD PRESSURE: 124 MMHG | OXYGEN SATURATION: 98 %

## 2023-04-28 DIAGNOSIS — M75.42 ROTATOR CUFF IMPINGEMENT SYNDROME OF LEFT SHOULDER: ICD-10-CM

## 2023-04-28 DIAGNOSIS — K21.9 GASTRO-ESOPHAGEAL REFLUX DISEASE WITHOUT ESOPHAGITIS: ICD-10-CM

## 2023-04-28 DIAGNOSIS — K44.9 DIAPHRAGMATIC HERNIA WITHOUT OBSTRUCTION OR GANGRENE: ICD-10-CM

## 2023-04-28 DIAGNOSIS — H69.81 DYSFUNCTION OF RIGHT EUSTACHIAN TUBE: Primary | ICD-10-CM

## 2023-04-28 RX ORDER — SOD CHLOR,SOD BICARB/NETI POT
1 PACKET, WITH RINSE DEVICE NASAL 3 TIMES DAILY
Qty: 50 EACH | Refills: 1 | Status: SHIPPED | OUTPATIENT
Start: 2023-04-28

## 2023-04-28 RX ORDER — PANTOPRAZOLE SODIUM 40 MG/1
40 TABLET, DELAYED RELEASE ORAL DAILY
Qty: 90 TABLET | Refills: 1 | Status: SHIPPED | OUTPATIENT
Start: 2023-04-28

## 2023-04-28 RX ORDER — IPRATROPIUM BROMIDE 42 UG/1
2 SPRAY, METERED NASAL 4 TIMES DAILY
Qty: 15 ML | Refills: 0 | Status: SHIPPED | OUTPATIENT
Start: 2023-04-28 | End: 2023-05-01

## 2023-04-28 RX ORDER — PANTOPRAZOLE SODIUM 40 MG/1
TABLET, DELAYED RELEASE ORAL
Qty: 90 TABLET | Refills: 1 | OUTPATIENT
Start: 2023-04-28

## 2023-04-28 NOTE — PROGRESS NOTES
Chief Complaint   Patient presents with    Shoulder Pain   Visit Vitals  /84 (BP 1 Location: Right arm, BP Patient Position: Sitting, BP Cuff Size: Large adult)   Pulse 97   Resp 18   Ht 5' 7\" (1.702 m)   Wt 181 lb 9.6 oz (82.4 kg)   SpO2 98%   BMI 28.44 kg/m²       1. \"Have you been to the ER, urgent care clinic since your last visit? Hospitalized since your last visit? \" No    2. \"Have you seen or consulted any other health care providers outside of the [address] [place] [address] since your last visit? \" No     3. For patients aged 39-70: Has the patient had a colonoscopy / FIT/ Cologuard? No      If the patient is female:    4. For patients aged 41-77: Has the patient had a mammogram within the past 2 years? No      5. For patients aged 21-65: Has the patient had a pap smear?  No

## 2023-04-28 NOTE — PROGRESS NOTES
ICD-10-CM ICD-9-CM    1. Dysfunction of right eustachian tube  H69.81 381.81 ipratropium (ATROVENT) 42 mcg (0.06 %) nasal spray      sod bicarb-sod chlor-neti pot (Ayr Saline Nasal Neti Rinse) pkdv      2. Rotator cuff impingement syndrome of left shoulder  M75.42 726.10 XR SHOULDER LT AP/LAT MIN 2 V      MRI SHOULDER LT WO CONT               Subjective:     Chief Complaint   Patient presents with    Shoulder Pain     Patient states he has a sinus infection as well       Gerline Sleeper. is a 44 y.o. M.  he  has a past medical history of Aspermia, GERD (gastroesophageal reflux disease), History of nephrolithiasis (09/2015), Hypertension, Migraines, and Overweight.     his last appointment in this clinic was 7/12/2022 . I reviewed and updated the medical record. This 42-year-old white male with past medical history of hypertension and nephrolithiasis presents for a couple of acute somatic complaints. Eustachian tube dysfunction: For the past couple of weeks, the patient has been having some fullness in his right ear. This has been initially accompanied by nasal congestion and rhinorrhea. Whereas the symptoms in his nose have been somewhat improved, he continues to have a sensation of ear fullness and a feeling of water in the ear, without overt ear pain. No fevers or chills or other constitutional or systemic complaints. No changes in hearing. Occasional dizzy spells which do not appear to be associated with the ear itself. No vertigo. No tinnitus. No other sick contacts. He has been trying TheraFlu for this so far which has been helpful. Left-sided shoulder rotator cuff tendinitis/impingement: For the past several weeks the patient has had intermittent left-sided shoulder pain. This is particularly more painful when attempting to reach behind his back. He denies having had any numbness or tingling or weakness otherwise.   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 He denies any other limitation with range of motion except for reaching backward behind his back, but does feel that the shoulder tentative frozen in place in some positions from time to time. His review of systems is otherwise negative. Routine Healthcare Maintenance issues are reviewed and discussed with the patient as noted below. Orders to update gaps in healthcare maintenance were placed as noted below in the Assessment and Plan, where applicable. Past Medical History:  Past Medical History:   Diagnosis Date    Aspermia     GERD (gastroesophageal reflux disease)     RX = Pantoprazole    History of nephrolithiasis 09/2015    CT scan    Hypertension     RX = HCTZ 25 mg/d    Migraines     Overweight        Past Surgical Histor:  History reviewed. No pertinent surgical history. Allergies:  No Known Allergies    Medications:  Current Outpatient Medications   Medication Sig Dispense Refill    pantoprazole (PROTONIX) 40 mg tablet Take 1 Tablet by mouth daily. 90 Tablet 1    ipratropium (ATROVENT) 42 mcg (0.06 %) nasal spray 2 Sprays by Both Nostrils route four (4) times daily for 3 days. Indications: runny nose 15 mL 0    sod bicarb-sod chlor-neti pot (Ayr Saline Nasal Neti Rinse) pkdv 1 Each by sinus irrigation route three (3) times daily. 50 Each 1    hydroCHLOROthiazide (HYDRODIURIL) 25 mg tablet Take 1 Tablet by mouth daily. (Patient not taking: Reported on 4/28/2023) 30 Tablet 0       Social History:  Social History     Socioeconomic History    Marital status: SINGLE   Tobacco Use    Smoking status: Never    Smokeless tobacco: Never   Vaping Use    Vaping Use: Never used   Substance and Sexual Activity    Alcohol use: No    Drug use: No       Family History:  History reviewed. No pertinent family history.     Immunizations:  Immunization History   Administered Date(s) Administered    Tdap 08/11/2016        Healthcare Maintenance:  Health Maintenance   Topic Date Due    COVID-19 Vaccine (1) Never done    Varicella Vaccine (1 of 2 - 2-dose childhood series) Never done    Depression Screen  04/12/2023    Flu Vaccine (Season Ended) 08/01/2023    DTaP/Tdap/Td series (2 - Td or Tdap) 08/11/2026    Hepatitis C Screening  Completed    Pneumococcal 0-64 years  Aged Out        Review of Systems:  ROS:  Review of Systems   Constitutional: Negative. HENT:  Positive for congestion. Eyes: Negative. Respiratory: Negative. Cardiovascular: Negative. Gastrointestinal: Negative. Genitourinary: Negative. Musculoskeletal:  Positive for joint pain. Skin: Negative. Neurological: Negative. Endo/Heme/Allergies: Negative. Psychiatric/Behavioral: Negative. ROS otherwise negative      Objective:     Vital Signs:  Visit Vitals  /84 (BP 1 Location: Right arm, BP Patient Position: Sitting, BP Cuff Size: Large adult)   Pulse 97   Resp 18   Ht 5' 7\" (1.702 m)   Wt 181 lb 9.6 oz (82.4 kg)   SpO2 98%   BMI 28.44 kg/m²       BMI:  Body mass index is 28.44 kg/m². Physical Examination:  Physical Exam  Vitals reviewed. Constitutional:       Appearance: Normal appearance. HENT:      Head: Normocephalic and atraumatic. Nose: Rhinorrhea present. Mouth/Throat:      Mouth: Mucous membranes are moist.      Pharynx: Oropharynx is clear. No oropharyngeal exudate or posterior oropharyngeal erythema. Eyes:      Extraocular Movements: Extraocular movements intact. Conjunctiva/sclera: Conjunctivae normal.      Pupils: Pupils are equal, round, and reactive to light. Cardiovascular:      Rate and Rhythm: Normal rate and regular rhythm. Pulses: Normal pulses. Heart sounds: Normal heart sounds. No murmur heard. No friction rub. No gallop. Pulmonary:      Effort: Pulmonary effort is normal. No respiratory distress. Breath sounds: Normal breath sounds. No wheezing, rhonchi or rales.    Abdominal:      General: Bowel sounds are normal. There is no distension. Palpations: Abdomen is soft. There is no mass. Tenderness: There is no abdominal tenderness. There is no guarding or rebound. Musculoskeletal:         General: No tenderness, deformity or signs of injury. Right shoulder: Normal.      Left shoulder: No swelling, deformity, effusion, tenderness, bony tenderness or crepitus. Decreased range of motion (normal epley scractch test, + [employer], + reproduction of pain with passive ROM with reaching behind back). Normal strength. Normal pulse. Cervical back: Normal range of motion and neck supple. Right lower leg: No edema. Left lower leg: No edema. Skin:     General: Skin is warm and dry. Findings: No bruising, lesion or rash. Neurological:      General: No focal deficit present. Mental Status: He is alert and oriented to person, place, and time. Mental status is at baseline. Cranial Nerves: No cranial nerve deficit. Sensory: No sensory deficit. Motor: No weakness. Coordination: Coordination normal.      Gait: Gait normal.      Deep Tendon Reflexes: Reflexes normal.   Psychiatric:         Mood and Affect: Mood normal.         Behavior: Behavior normal.        Physical exam otherwise negative    Diagnostic Testing:    Laboratory Studies:  No visits with results within 6 Month(s) from this visit. Latest known visit with results is:   Office Visit on 04/12/2022   Component Date Value Ref Range Status    Hep C Virus Ab 04/12/2022 <0.1  0.0 - 0.9 s/co ratio Final    Comment:                                   Negative:     < 0.8                               Indeterminate: 0.8 - 0.9                                    Positive:     > 0.9   The CDC recommends that a positive HCV antibody result   be followed up with a HCV Nucleic Acid Amplification   test (107548).            Radiographic Studies:  XR Results (most recent):  No results found for this or any previous visit.     [facility] Results (most recent):  No results found for this or any previous visit. CT Results (most recent):  Results from Hospital Encounter encounter on 09/29/15    CT ABD PELV W CONT    Narrative  **Final Report**      ICD Codes / Adm. Diagnosis: 669306   / Flank Pain  side pain, vomiting  Examination:  CT ABD PELVIS W CON  - [id number] - Sep 29 2015  6:36PM  Accession No:  [id number]  Reason:  RLQ pain      REPORT:  INDICATION:   Right abdominal pain    COMPARISON:  None    TECHNIQUE: Thin collimation axial images were obtained through the abdomen  and pelvis with intravenous contrast administration. Multiplanar  reconstructions were generated. FINDINGS:    Abdomen:    No liver abnormality. No biliary ductal dilatation. No calcified gallstone. No abnormality of the spleen, pancreas, or adrenal glands. Normal symmetric nephrograms without focal lesion. Small 2 mm obstructing  calculus in the proximal right ureter (image 50) causing minimal right  hydronephrosis. No other evidence for obstructing renal calculus. There are  small nonobstructing right renal calculi as well. No left hydronephrosis. No abdominal lymphadenopathy or ascites. Pelvis:    Normal appendix. No evidence for acute bowel abnormality or obstruction. No pelvic lymphadenopathy or free fluid. The bladder is unremarkable. No acute osseous abnormality. Impression  :    Small 2 mm obstructing calculus in the proximal right ureter causing minimal  hydronephrosis. Signing/Reading Doctor: Marianna Jarrell [facility] ([id number])  Cece Olson [facility] ([id number])  Sep 29 2015  7:08PM     DEXA Results (most recent):  No results found for this or any previous visit. MRI Results (most recent):  No results found for this or any previous visit. Assessment/Plan:       ICD-10-CM ICD-9-CM    1.  Dysfunction of right eustachian tube  H69.81 381.81 ipratropium (ATROVENT) 42 mcg (0.06 %) nasal spray      sod bicarb-sod chlor-neti pot (Ayr Saline Nasal Neti Rinse) pkdv      2. Rotator cuff impingement syndrome of left shoulder  M75.42 726.10 XR SHOULDER LT AP/LAT MIN 2 V      MRI SHOULDER LT WO CONT             Rotator cuff tendinitis:  - With suspected possible impingement. A labral injury is not excluded at this time given the pain with passive range of motion. Conservative management is advised. Checking x-ray today. Given difficulty with passive range of motion as well as the patient's current occupation, checking MRI as well. Eustachian tube dysfunction:  - No evidence at this point to suggest otitis media. An upper respiratory tract infection is likely present. Conservative management is provided for now. Nasal saline rinses and Atrovent nasal spray are provided. Discussed return precautions. Deferring antibiotics for now given nontoxic parents, lack of evidence to support otitis media. I have reviewed the patient's medical history in detail and updated the computerized patient record. We had a prolonged discussion about these complex clinical issues and went over the various important aspects to consider. All questions were answered. Advised the patient to call back or return to office if symptoms do not improve, change in nature, or persist.     The patient was given an after visit summary or informed of MyChart Access which includes patient instructions, diagnoses, current medications, & vitals. he expressed understanding with the diagnosis and plan. Vinson Lesches, MD    Please note that this dictation was completed with [employer]ENT, the [employer] voice recognition software. Quite often unanticipated grammatical, syntax, homophones, and other interpretive errors are inadvertently transcribed by the computer software. Please disregard these errors. Please excuse any errors that have escaped final proofreading.

## 2023-05-09 ENCOUNTER — TRANSCRIBE ORDERS (OUTPATIENT)
Facility: HOSPITAL | Age: 38
End: 2023-05-09

## 2023-05-09 DIAGNOSIS — M75.42 ROTATOR CUFF IMPINGEMENT SYNDROME OF LEFT SHOULDER: Primary | ICD-10-CM

## 2024-02-19 RX ORDER — PANTOPRAZOLE SODIUM 40 MG/1
40 TABLET, DELAYED RELEASE ORAL DAILY
Qty: 30 TABLET | Refills: 0 | OUTPATIENT
Start: 2024-02-19

## 2024-02-19 NOTE — TELEPHONE ENCOUNTER
PCP: Babatunde Rodriguez MD    Last appt: 4/28/2023    No future appointments.    Requested Prescriptions     Pending Prescriptions Disp Refills    pantoprazole (PROTONIX) 40 MG tablet 30 tablet 0     Sig: Take 1 tablet by mouth daily

## 2024-02-19 NOTE — TELEPHONE ENCOUNTER
Pharmacy: formerly [facility]  Patient advised he will need to find a new pcp.       pantoprazole (PROTONIX) 40 MG tablet [[id number]]    Order Details  Dose: 1 tablet Route: Oral Frequency: DAILY   Dispense Quantity: -- Refills: --          Sig: Take 1 tablet by mouth daily

## 2024-02-20 NOTE — TELEPHONE ENCOUNTER
Spoke to patient advised of Dr. Rodriguez leaving practice and need for establishing/physical appointment. Patient advised Jr Garcia NP will be happy to take him as a patient. Patient states he will call back to schedule once he has his calendar.